# Patient Record
Sex: FEMALE | URBAN - METROPOLITAN AREA
[De-identification: names, ages, dates, MRNs, and addresses within clinical notes are randomized per-mention and may not be internally consistent; named-entity substitution may affect disease eponyms.]

---

## 2023-07-13 ENCOUNTER — INPATIENT (INPATIENT)
Facility: HOSPITAL | Age: 58
LOS: 4 days | Discharge: HOME | DRG: 286 | End: 2023-07-18
Attending: HOSPITALIST | Admitting: HOSPITALIST
Payer: COMMERCIAL

## 2023-07-13 VITALS
OXYGEN SATURATION: 97 % | DIASTOLIC BLOOD PRESSURE: 101 MMHG | HEART RATE: 93 BPM | TEMPERATURE: 98 F | HEIGHT: 67 IN | SYSTOLIC BLOOD PRESSURE: 163 MMHG | RESPIRATION RATE: 18 BRPM | WEIGHT: 184.97 LBS

## 2023-07-13 PROCEDURE — 99285 EMERGENCY DEPT VISIT HI MDM: CPT

## 2023-07-14 DIAGNOSIS — R60.0 LOCALIZED EDEMA: ICD-10-CM

## 2023-07-14 DIAGNOSIS — I10 ESSENTIAL (PRIMARY) HYPERTENSION: ICD-10-CM

## 2023-07-14 DIAGNOSIS — M79.89 OTHER SPECIFIED SOFT TISSUE DISORDERS: ICD-10-CM

## 2023-07-14 DIAGNOSIS — Z29.9 ENCOUNTER FOR PROPHYLACTIC MEASURES, UNSPECIFIED: ICD-10-CM

## 2023-07-14 DIAGNOSIS — E78.5 HYPERLIPIDEMIA, UNSPECIFIED: ICD-10-CM

## 2023-07-14 DIAGNOSIS — E11.9 TYPE 2 DIABETES MELLITUS WITHOUT COMPLICATIONS: ICD-10-CM

## 2023-07-14 DIAGNOSIS — I50.21 ACUTE SYSTOLIC (CONGESTIVE) HEART FAILURE: ICD-10-CM

## 2023-07-14 LAB
A1C WITH ESTIMATED AVERAGE GLUCOSE RESULT: 8.3 % — HIGH (ref 4–5.6)
ALBUMIN SERPL ELPH-MCNC: 4.1 G/DL — SIGNIFICANT CHANGE UP (ref 3.3–5)
ALP SERPL-CCNC: 61 U/L — SIGNIFICANT CHANGE UP (ref 40–120)
ALT FLD-CCNC: 17 U/L — SIGNIFICANT CHANGE UP (ref 10–45)
ANION GAP SERPL CALC-SCNC: 13 MMOL/L — SIGNIFICANT CHANGE UP (ref 5–17)
APPEARANCE UR: CLEAR — SIGNIFICANT CHANGE UP
AST SERPL-CCNC: 28 U/L — SIGNIFICANT CHANGE UP (ref 10–40)
BACTERIA # UR AUTO: NEGATIVE — SIGNIFICANT CHANGE UP
BASOPHILS # BLD AUTO: 0.02 K/UL — SIGNIFICANT CHANGE UP (ref 0–0.2)
BASOPHILS NFR BLD AUTO: 0.6 % — SIGNIFICANT CHANGE UP (ref 0–2)
BILIRUB SERPL-MCNC: 1.9 MG/DL — HIGH (ref 0.2–1.2)
BILIRUB UR-MCNC: ABNORMAL
BUN SERPL-MCNC: 11 MG/DL — SIGNIFICANT CHANGE UP (ref 7–23)
CALCIUM SERPL-MCNC: 9.2 MG/DL — SIGNIFICANT CHANGE UP (ref 8.4–10.5)
CHLORIDE SERPL-SCNC: 106 MMOL/L — SIGNIFICANT CHANGE UP (ref 96–108)
CO2 SERPL-SCNC: 23 MMOL/L — SIGNIFICANT CHANGE UP (ref 22–31)
COLOR SPEC: YELLOW — SIGNIFICANT CHANGE UP
CREAT SERPL-MCNC: 0.81 MG/DL — SIGNIFICANT CHANGE UP (ref 0.5–1.3)
DIFF PNL FLD: ABNORMAL
EGFR: 84 ML/MIN/1.73M2 — SIGNIFICANT CHANGE UP
EOSINOPHIL # BLD AUTO: 0.05 K/UL — SIGNIFICANT CHANGE UP (ref 0–0.5)
EOSINOPHIL NFR BLD AUTO: 1.4 % — SIGNIFICANT CHANGE UP (ref 0–6)
EPI CELLS # UR: 7 /HPF — HIGH
ESTIMATED AVERAGE GLUCOSE: 192 MG/DL — HIGH (ref 68–114)
GLUCOSE BLDC GLUCOMTR-MCNC: 139 MG/DL — HIGH (ref 70–99)
GLUCOSE BLDC GLUCOMTR-MCNC: 148 MG/DL — HIGH (ref 70–99)
GLUCOSE BLDC GLUCOMTR-MCNC: 170 MG/DL — HIGH (ref 70–99)
GLUCOSE BLDC GLUCOMTR-MCNC: 258 MG/DL — HIGH (ref 70–99)
GLUCOSE SERPL-MCNC: 204 MG/DL — HIGH (ref 70–99)
GLUCOSE UR QL: ABNORMAL
HCT VFR BLD CALC: 41.6 % — SIGNIFICANT CHANGE UP (ref 34.5–45)
HGB BLD-MCNC: 13.4 G/DL — SIGNIFICANT CHANGE UP (ref 11.5–15.5)
HYALINE CASTS # UR AUTO: 1 /LPF — SIGNIFICANT CHANGE UP (ref 0–7)
IMM GRANULOCYTES NFR BLD AUTO: 0.3 % — SIGNIFICANT CHANGE UP (ref 0–0.9)
KETONES UR-MCNC: NEGATIVE — SIGNIFICANT CHANGE UP
LEUKOCYTE ESTERASE UR-ACNC: ABNORMAL
LYMPHOCYTES # BLD AUTO: 1.39 K/UL — SIGNIFICANT CHANGE UP (ref 1–3.3)
LYMPHOCYTES # BLD AUTO: 39.4 % — SIGNIFICANT CHANGE UP (ref 13–44)
MAGNESIUM SERPL-MCNC: 1.6 MG/DL — SIGNIFICANT CHANGE UP (ref 1.6–2.6)
MCHC RBC-ENTMCNC: 32.2 GM/DL — SIGNIFICANT CHANGE UP (ref 32–36)
MCHC RBC-ENTMCNC: 32.4 PG — SIGNIFICANT CHANGE UP (ref 27–34)
MCV RBC AUTO: 100.7 FL — HIGH (ref 80–100)
MONOCYTES # BLD AUTO: 0.14 K/UL — SIGNIFICANT CHANGE UP (ref 0–0.9)
MONOCYTES NFR BLD AUTO: 4 % — SIGNIFICANT CHANGE UP (ref 2–14)
NEUTROPHILS # BLD AUTO: 1.92 K/UL — SIGNIFICANT CHANGE UP (ref 1.8–7.4)
NEUTROPHILS NFR BLD AUTO: 54.3 % — SIGNIFICANT CHANGE UP (ref 43–77)
NITRITE UR-MCNC: NEGATIVE — SIGNIFICANT CHANGE UP
NRBC # BLD: 0 /100 WBCS — SIGNIFICANT CHANGE UP (ref 0–0)
NT-PROBNP SERPL-SCNC: 3815 PG/ML — HIGH (ref 0–300)
PH UR: 5.5 — SIGNIFICANT CHANGE UP (ref 5–8)
PHOSPHATE SERPL-MCNC: 3.5 MG/DL — SIGNIFICANT CHANGE UP (ref 2.5–4.5)
PLATELET # BLD AUTO: 179 K/UL — SIGNIFICANT CHANGE UP (ref 150–400)
POTASSIUM SERPL-MCNC: 4.1 MMOL/L — SIGNIFICANT CHANGE UP (ref 3.5–5.3)
POTASSIUM SERPL-SCNC: 4.1 MMOL/L — SIGNIFICANT CHANGE UP (ref 3.5–5.3)
PROT SERPL-MCNC: 6.3 G/DL — SIGNIFICANT CHANGE UP (ref 6–8.3)
PROT UR-MCNC: ABNORMAL
RBC # BLD: 4.13 M/UL — SIGNIFICANT CHANGE UP (ref 3.8–5.2)
RBC # FLD: 14.6 % — HIGH (ref 10.3–14.5)
RBC CASTS # UR COMP ASSIST: 4 /HPF — SIGNIFICANT CHANGE UP (ref 0–4)
SODIUM SERPL-SCNC: 142 MMOL/L — SIGNIFICANT CHANGE UP (ref 135–145)
SP GR SPEC: 1.02 — SIGNIFICANT CHANGE UP (ref 1.01–1.02)
TROPONIN T, HIGH SENSITIVITY RESULT: 21 NG/L — SIGNIFICANT CHANGE UP (ref 0–51)
TROPONIN T, HIGH SENSITIVITY RESULT: 24 NG/L — SIGNIFICANT CHANGE UP (ref 0–51)
UROBILINOGEN FLD QL: ABNORMAL
WBC # BLD: 3.53 K/UL — LOW (ref 3.8–10.5)
WBC # FLD AUTO: 3.53 K/UL — LOW (ref 3.8–10.5)
WBC UR QL: 8 /HPF — HIGH (ref 0–5)

## 2023-07-14 PROCEDURE — 99223 1ST HOSP IP/OBS HIGH 75: CPT

## 2023-07-14 PROCEDURE — 71046 X-RAY EXAM CHEST 2 VIEWS: CPT | Mod: 26

## 2023-07-14 PROCEDURE — 93356 MYOCRD STRAIN IMG SPCKL TRCK: CPT

## 2023-07-14 PROCEDURE — 93970 EXTREMITY STUDY: CPT | Mod: 26

## 2023-07-14 PROCEDURE — 93306 TTE W/DOPPLER COMPLETE: CPT | Mod: 26

## 2023-07-14 RX ORDER — GLUCAGON INJECTION, SOLUTION 0.5 MG/.1ML
1 INJECTION, SOLUTION SUBCUTANEOUS ONCE
Refills: 0 | Status: DISCONTINUED | OUTPATIENT
Start: 2023-07-14 | End: 2023-07-18

## 2023-07-14 RX ORDER — SODIUM CHLORIDE 9 MG/ML
1000 INJECTION, SOLUTION INTRAVENOUS
Refills: 0 | Status: DISCONTINUED | OUTPATIENT
Start: 2023-07-14 | End: 2023-07-18

## 2023-07-14 RX ORDER — FUROSEMIDE 40 MG
20 TABLET ORAL ONCE
Refills: 0 | Status: COMPLETED | OUTPATIENT
Start: 2023-07-14 | End: 2023-07-14

## 2023-07-14 RX ORDER — ACETAMINOPHEN 500 MG
650 TABLET ORAL EVERY 6 HOURS
Refills: 0 | Status: DISCONTINUED | OUTPATIENT
Start: 2023-07-14 | End: 2023-07-18

## 2023-07-14 RX ORDER — DEXTROSE 50 % IN WATER 50 %
25 SYRINGE (ML) INTRAVENOUS ONCE
Refills: 0 | Status: DISCONTINUED | OUTPATIENT
Start: 2023-07-14 | End: 2023-07-18

## 2023-07-14 RX ORDER — INSULIN LISPRO 100/ML
VIAL (ML) SUBCUTANEOUS
Refills: 0 | Status: DISCONTINUED | OUTPATIENT
Start: 2023-07-14 | End: 2023-07-18

## 2023-07-14 RX ORDER — DEXTROSE 50 % IN WATER 50 %
12.5 SYRINGE (ML) INTRAVENOUS ONCE
Refills: 0 | Status: DISCONTINUED | OUTPATIENT
Start: 2023-07-14 | End: 2023-07-18

## 2023-07-14 RX ORDER — LANOLIN ALCOHOL/MO/W.PET/CERES
3 CREAM (GRAM) TOPICAL AT BEDTIME
Refills: 0 | Status: DISCONTINUED | OUTPATIENT
Start: 2023-07-14 | End: 2023-07-18

## 2023-07-14 RX ORDER — INSULIN LISPRO 100/ML
VIAL (ML) SUBCUTANEOUS AT BEDTIME
Refills: 0 | Status: DISCONTINUED | OUTPATIENT
Start: 2023-07-14 | End: 2023-07-18

## 2023-07-14 RX ORDER — ATORVASTATIN CALCIUM 80 MG/1
80 TABLET, FILM COATED ORAL AT BEDTIME
Refills: 0 | Status: DISCONTINUED | OUTPATIENT
Start: 2023-07-14 | End: 2023-07-18

## 2023-07-14 RX ORDER — DEXTROSE 50 % IN WATER 50 %
15 SYRINGE (ML) INTRAVENOUS ONCE
Refills: 0 | Status: DISCONTINUED | OUTPATIENT
Start: 2023-07-14 | End: 2023-07-18

## 2023-07-14 RX ORDER — AMLODIPINE BESYLATE 2.5 MG/1
10 TABLET ORAL DAILY
Refills: 0 | Status: DISCONTINUED | OUTPATIENT
Start: 2023-07-14 | End: 2023-07-15

## 2023-07-14 RX ORDER — FUROSEMIDE 40 MG
20 TABLET ORAL DAILY
Refills: 0 | Status: DISCONTINUED | OUTPATIENT
Start: 2023-07-15 | End: 2023-07-16

## 2023-07-14 RX ADMIN — AMLODIPINE BESYLATE 10 MILLIGRAM(S): 2.5 TABLET ORAL at 12:18

## 2023-07-14 RX ADMIN — ATORVASTATIN CALCIUM 80 MILLIGRAM(S): 80 TABLET, FILM COATED ORAL at 21:58

## 2023-07-14 RX ADMIN — Medication 1: at 21:59

## 2023-07-14 RX ADMIN — Medication 20 MILLIGRAM(S): at 21:59

## 2023-07-14 RX ADMIN — Medication 20 MILLIGRAM(S): at 03:12

## 2023-07-14 NOTE — H&P ADULT - HISTORY OF PRESENT ILLNESS
58 y/o F w PMHx of HTN, T2DM, HLD presenting with 3 weeks of worsening LE edema. Has been having LE edema for the past couple of weeks; LLE swelling worse than RLE. Also admitted to areas of "weeping" fluid 2 weeks ago that has now resolved. She had recent travel to [place] but said swelling had started prior to her trip. She also endorses decreased UOP. Denies any CP, SOB, n/v, abd pain, diarrhea, dysuria, fevers, chills. Presented to the ED for further evaluation of LE edema.   Of note, patient has been noncompliant with her home meds.   In ED, afebrile, HR 85, 176/118, 96% on RA. pBNP 3815, CXR w/ cardiomegaly with mild pulmonary edema. Given lasix 20 IV x1 in ED and admitted to medicine for further management.

## 2023-07-14 NOTE — H&P ADULT - TIME BILLING
reviewing documentation/labs/imaging, interviewing and examining patient, documentation, coordinating care with ACP/CM/Specialists

## 2023-07-14 NOTE — ED PROVIDER NOTE - OBJECTIVE STATEMENT
This is a 57-year-old female with past medical history of diabetes, hypertension, hyperlipidemia presenting with swelling of the lower extremities.  For past several weeks patient has had on and off swelling of the bilateral lower extremities.  During this time patient is also been in [place], eating a lot of salty foods, reduced compliance of her medications.  Patient also endorses reduced urine output but denies CP, SOB, orthopnea, fever, sore throat, abdominal, nausea, vomiting, diarrhea, LE pain.

## 2023-07-14 NOTE — H&P ADULT - NSHPLABSRESULTS_GEN_ALL_CORE
LABS:                         13.4   3.53  )-----------( 179      ( 2023 01:27 )             41.6     07-14    142  |  106  |  11  ----------------------------<  204<H>  4.1   |  23  |  0.81    Ca    9.2      2023 01:28  Phos  3.5     07-14  Mg     1.6     -14    TPro  6.3  /  Alb  4.1  /  TBili  1.9<H>  /  DBili  x   /  AST  28  /  ALT  17  /  AlkPhos  61  07-14      Urinalysis Basic - ( 2023 01:55 )    Color: Yellow / Appearance: Clear / S.024 / pH: x  Gluc: x / Ketone: Negative  / Bili: Small / Urobili: 8 mg/dL   Blood: x / Protein: 100 mg/dl / Nitrite: Negative   Leuk Esterase: Small / RBC: 4 /hpf / WBC 8 /HPF   Sq Epi: x / Non Sq Epi: x / Bacteria: Negative      EKG: NSR  CXR: cardiomegaly with mild pulmonary edema.  LE Duplex  IMPRESSION:    No evidence of bilateral lower extremity deep venous thrombosis.    Bilateral lower extremity superficial soft tissue edema. Correlate   clinically.    Slightly increased pulsatility of common femoral vein waveforms, which   may be due to underlying cardiac dysfunction. Correlate with   echocardiogram.

## 2023-07-14 NOTE — H&P ADULT - PROBLEM SELECTOR PLAN 1
3 weeks of worsening B/L LE edema. No CP, SOB. CXR with mild pulm edema. Possible new HF. pBNP 3000s. Trop neg.   -LE duplex neg for DVT   -S/p Lasix 20 IV x1 in ED. Will continue lasix 20 IV daily for now.   -Monitor I/O; daily weight   -TTE

## 2023-07-14 NOTE — ED PROVIDER NOTE - NS ED ROS FT
General: denies fever, chills  Eyes: denies visual changes, blurred vision  CV: denies chest pain, palpitations  Resp: denies difficulty breathing, cough, orthopnea  Abdominal: denies nausea, vomiting, diarrhea, abdominal pain  : +reduced urinary output, denies urinary pain or discharge  MSK: +BL LE swelling without LE pain, denies muscle aches  Neuro: denies headaches, numbness, tingling  Skin: denies rashes, bruises

## 2023-07-14 NOTE — H&P ADULT - NSHPPHYSICALEXAM_GEN_ALL_CORE
Vital Signs Last 24 Hrs  T(C): 36.6 (14 Jul 2023 08:28), Max: 36.9 (14 Jul 2023 01:45)  T(F): 97.9 (14 Jul 2023 08:28), Max: 98.5 (14 Jul 2023 01:45)  HR: 78 (14 Jul 2023 08:28) (78 - 93)  BP: 158/96 (14 Jul 2023 08:28) (158/96 - 176/118)  BP(mean): 117 (14 Jul 2023 08:28) (117 - 117)  RR: 18 (14 Jul 2023 08:28) (18 - 18)  SpO2: 96% (14 Jul 2023 08:28) (94% - 97%)    Parameters below as of 14 Jul 2023 08:28  Patient On (Oxygen Delivery Method): room air        CONSTITUTIONAL: Well-groomed, in no apparent distress  EYES: No conjunctival or scleral injection, non-icteric; EOMI  ENMT: No external nasal lesions; no pharyngeal injection or exudates, oral mucosa with moist membranes  NECK: Supple; Trachea midline  RESPIRATORY: Breathing comfortably; lungs CTA without wheeze  CARDIOVASCULAR: +S1S2, RRR; pedal pulses full and symmetric; + b/l LE (L >R); edema up to thighs   GASTROINTESTINAL: No tenderness, +BS throughout, no rebound/guarding  MUSCULOSKELETAL: No digital clubbing or cyanosis; no paraspinal tenderness; examination of the  (head/neck, spine/ribs/pelvis, RUE, LUE, RLE, LLE) without misalignment, normal strength and tone of extremities  NEUROLOGIC: CN II-XII grossly intact; sensation intact in LEs b/l to light touch  PSYCHIATRIC: A+O x 3; mood and affect appropriate; appropriate insight and judgment

## 2023-07-14 NOTE — PROGRESS NOTE ADULT - SUBJECTIVE AND OBJECTIVE BOX
PATIENT: BHUPENDRA ROE, MRN: [id number]    CHIEF COMPLAINT: Patient is a 57y old  Female who presents with a chief complaint of CC: LE swelling (2023 10:16)      INTERVAL HISTORY/OVERNIGHT EVENTS: Pt received lasix in ED tolerated, feels like her legs are less swollen today.       MEDICATIONS:  MEDICATIONS  (STANDING):  amLODIPine   Tablet 10 milliGRAM(s) Oral daily  atorvastatin 80 milliGRAM(s) Oral at bedtime  dextrose 5%. 1000 milliLiter(s) (100 mL/Hr) IV Continuous <Continuous>  dextrose 5%. 1000 milliLiter(s) (50 mL/Hr) IV Continuous <Continuous>  dextrose 50% Injectable 25 Gram(s) IV Push once  dextrose 50% Injectable 12.5 Gram(s) IV Push once  dextrose 50% Injectable 25 Gram(s) IV Push once  glucagon  Injectable 1 milliGRAM(s) IntraMuscular once  insulin lispro (ADMELOG) corrective regimen sliding scale   SubCutaneous three times a day before meals  insulin lispro (ADMELOG) corrective regimen sliding scale   SubCutaneous at bedtime    MEDICATIONS  (PRN):  acetaminophen     Tablet .. 650 milliGRAM(s) Oral every 6 hours PRN Temp greater or equal to 38C (100.4F), Mild Pain (1 - 3)  dextrose Oral Gel 15 Gram(s) Oral once PRN Blood Glucose LESS THAN 70 milliGRAM(s)/deciliter  melatonin 3 milliGRAM(s) Oral at bedtime PRN Insomnia      ALLERGIES: Allergies    No Known Allergies    Intolerances        OBJECTIVE:  ICU Vital Signs Last 24 Hrs  T(C): 37 (2023 14:47), Max: 37 (2023 14:47)  T(F): 98.6 (2023 14:47), Max: 98.6 (2023 14:47)  HR: 81 (2023 17:14) (78 - 93)  BP: 133/83 (2023 17:14) (133/83 - 176/118)  BP(mean): 117 (2023 08:28) (117 - 117)  ABP: --  ABP(mean): --  RR: 16 (2023 17:14) (16 - 18)  SpO2: 99% (2023 14:47) (94% - 100%)    O2 Parameters below as of 2023 14:47  Patient On (Oxygen Delivery Method): room air            CAPILLARY BLOOD GLUCOSE      POCT Blood Glucose.: 148 mg/dL (2023 17:50)  POCT Blood Glucose.: 139 mg/dL (2023 14:45)  POCT Blood Glucose.: 170 mg/dL (2023 12:01)    CAPILLARY BLOOD GLUCOSE      POCT Blood Glucose.: 148 mg/dL (2023 17:50)    I&O's Summary    Daily Height in cm: 170.18 (2023 21:51)    Daily     PHYSICAL EXAMINATION:  General: Comfortable, no acute distress, cooperative with exam.  HEENT: PERRLA  Respiratory: CTAB, normal respiratory effort, no coughing, wheezes, crackles, or rales.  CV: RRR, S1S2, no murmurs, rubs or gallops. No JVD. Distal pulses intact.  Abdominal: Soft, nontender, nondistended, no rebound or guarding, normal bowel sounds.  Neurology: AOx3, no focal neuro defects, WATTS x 4.  Extremities: + pitting edema to level of shins b/l      LABS:                          13.4   3.53  )-----------( 179      ( 2023 01:27 )             41.6     07-14    142  |  106  |  11  ----------------------------<  204<H>  4.1   |  23  |  0.81    Ca    9.2      2023 01:28  Phos  3.5     07-14  Mg     1.6     07-14    TPro  6.3  /  Alb  4.1  /  TBili  1.9<H>  /  DBili  x   /  AST  28  /  ALT  17  /  AlkPhos  61  07-14    LIVER FUNCTIONS - ( 2023 01:28 )  Alb: 4.1 g/dL / Pro: 6.3 g/dL / ALK PHOS: 61 U/L / ALT: 17 U/L / AST: 28 U/L / GGT: x                   Urinalysis Basic - ( 2023 01:55 )    Color: Yellow / Appearance: Clear / S.024 / pH: x  Gluc: x / Ketone: Negative  / Bili: Small / Urobili: 8 mg/dL   Blood: x / Protein: 100 mg/dl / Nitrite: Negative   Leuk Esterase: Small / RBC: 4 /hpf / WBC 8 /HPF   Sq Epi: x / Non Sq Epi: x / Bacteria: Negative

## 2023-07-14 NOTE — H&P ADULT - NSHPSOCIALHISTORY_GEN_ALL_CORE
denies tobacco use  social etoh use  denies drug use  lives alone in apt  no difficulties with ambulation PTA

## 2023-07-14 NOTE — ED ADULT NURSE NOTE - NSFALLUNIVINTERV_ED_ALL_ED
Bed/Stretcher in lowest position, wheels locked, appropriate side rails in place/Call bell, personal items and telephone in reach/Instruct patient to call for assistance before getting out of bed/chair/stretcher/Non-slip footwear applied when patient is off stretcher/[place] to call system/Physically safe environment - no spills, clutter or unnecessary equipment/Purposeful proactive rounding/Room/bathroom lighting operational, light cord in reach

## 2023-07-14 NOTE — ED PROVIDER NOTE - ATTENDING CONTRIBUTION TO CARE
------------ATTENDING NOTE------------  pt w/ daughter c/o 2 wks of gradually increasing BLE edema, worsening dyspnea on exertion and mild orthopnea, no chest pain/discomfort, no sob/dyspnea at rest, no fevers, pt has DM and HTN and HLD and poorly compliant w/ medications / diet, concerned she has decreased urine outpt, awaiting labs/iamging and close reassessments -->  - Chevy Turcios MD   ----------------------------------------------

## 2023-07-14 NOTE — ED ADULT NURSE NOTE - OBJECTIVE STATEMENT
56 y/o female presents to the ED endorsing BL LE edema x3 weeks. A/Ox4. Ambulatory without assistive devices at baseline. PMH: HTN, HLD and DM. Patient endorses on 7/13/23 a small "spot" on the LLE that was "leaking fluid". Patient denies chest pain, dyspnea, fever, cough, chills, nausea and vomiting. Upon assessment, sensation, motor and pulses intact in the BL LE. BL pitting edema of the LE noted. Patient on CM, NSR. Safety and comfort provided.

## 2023-07-14 NOTE — ED PROVIDER NOTE - PHYSICAL EXAMINATION
GENERAL: well appearing in no acute distress, non-toxic appearing  HEAD: normocephalic, atraumatic  HEENT: normal conjunctiva, oral mucosa moist, neck supple, no JVD  CARDIAC: regular rate and rhythm, normal S1S2, no appreciable murmurs, 2+ pulses in UE/LE b/l  PULM: normal breath sounds, clear to ascultation bilaterally, no rales, rhonchi, wheezing  GI: abdomen nondistended, soft, nontender, no guarding, rebound tenderness  NEURO: no focal motor or sensory deficits, normal speech, normal gait, AAOx3  MSK: +BL LE pitting edema to hips, no calf tenderness b/l  SKIN: well-perfused, extremities warm, no visible rashes  PSYCH: appropriate mood and affect

## 2023-07-14 NOTE — H&P ADULT - ASSESSMENT
58 y/o F w PMHx of HTN, T2DM, HLD, noncompliant with home medications presenting with 3 weeks of worsening LE edema.

## 2023-07-14 NOTE — PROGRESS NOTE ADULT - PROBLEM SELECTOR PLAN 1
3 weeks of worsening B/L LE edema. No CP, SOB. CXR with mild pulm edema.  pBNP 3000s. Trop neg.   -LE duplex neg for DVT   -S/p Lasix 20 IV x1 in ED. Will continue lasix 20 IV daily for now next dose 7/15 AM   -Monitor I/O; daily weight   -TTE: EF 30-35%

## 2023-07-14 NOTE — H&P ADULT - NSHPREVIEWOFSYSTEMS_GEN_ALL_CORE
Review of Systems:   CONSTITUTIONAL: No fever  EYES: No eye pain or discharge  ENMT:  No tinnitus, vertigo; No sinus or throat pain  RESPIRATORY: No SOB. No cough, wheezing, chills or hemoptysis  CARDIOVASCULAR: No chest pain, palpitations, dizziness. +leg swelling  GASTROINTESTINAL: No abdominal or epigastric pain. No nausea, vomiting, or hematemesis; No diarrhea or constipation. No melena or hematochezia.  GENITOURINARY: No dysuria, frequency, hematuria, or incontinence  NEUROLOGICAL: No headaches, loss of strength, numbness, or tremors  SKIN: No itching, burning  MUSCULOSKELETAL: No muscle, back pain  PSYCHIATRIC: No depression, +anxiety, no mood swings  HEME/LYMPH: No easy bruising, or bleeding gums

## 2023-07-14 NOTE — H&P ADULT - PROBLEM SELECTOR PLAN 2
Noncompliant with home medications. Hypertensive on arrival to ED.   -C/w norvasc with holding parameters   -Monitor BP

## 2023-07-15 LAB
A1C WITH ESTIMATED AVERAGE GLUCOSE RESULT: 8.3 % — HIGH (ref 4–5.6)
ALBUMIN SERPL ELPH-MCNC: 3.9 G/DL — SIGNIFICANT CHANGE UP (ref 3.3–5)
ALP SERPL-CCNC: 60 U/L — SIGNIFICANT CHANGE UP (ref 40–120)
ALT FLD-CCNC: 19 U/L — SIGNIFICANT CHANGE UP (ref 10–45)
ANION GAP SERPL CALC-SCNC: 13 MMOL/L — SIGNIFICANT CHANGE UP (ref 5–17)
AST SERPL-CCNC: 32 U/L — SIGNIFICANT CHANGE UP (ref 10–40)
BILIRUB DIRECT SERPL-MCNC: 0.6 MG/DL — HIGH (ref 0–0.3)
BILIRUB INDIRECT FLD-MCNC: 2.1 MG/DL — HIGH (ref 0.2–1)
BILIRUB SERPL-MCNC: 2.7 MG/DL — HIGH (ref 0.2–1.2)
BUN SERPL-MCNC: 8 MG/DL — SIGNIFICANT CHANGE UP (ref 7–23)
CALCIUM SERPL-MCNC: 9.4 MG/DL — SIGNIFICANT CHANGE UP (ref 8.4–10.5)
CHLORIDE SERPL-SCNC: 102 MMOL/L — SIGNIFICANT CHANGE UP (ref 96–108)
CHOLEST SERPL-MCNC: 132 MG/DL — SIGNIFICANT CHANGE UP
CO2 SERPL-SCNC: 28 MMOL/L — SIGNIFICANT CHANGE UP (ref 22–31)
CREAT SERPL-MCNC: 0.76 MG/DL — SIGNIFICANT CHANGE UP (ref 0.5–1.3)
EGFR: 91 ML/MIN/1.73M2 — SIGNIFICANT CHANGE UP
ESTIMATED AVERAGE GLUCOSE: 192 MG/DL — HIGH (ref 68–114)
GLUCOSE BLDC GLUCOMTR-MCNC: 147 MG/DL — HIGH (ref 70–99)
GLUCOSE BLDC GLUCOMTR-MCNC: 148 MG/DL — HIGH (ref 70–99)
GLUCOSE BLDC GLUCOMTR-MCNC: 153 MG/DL — HIGH (ref 70–99)
GLUCOSE BLDC GLUCOMTR-MCNC: 193 MG/DL — HIGH (ref 70–99)
GLUCOSE SERPL-MCNC: 174 MG/DL — HIGH (ref 70–99)
HCT VFR BLD CALC: 42.4 % — SIGNIFICANT CHANGE UP (ref 34.5–45)
HCV AB S/CO SERPL IA: 0.17 S/CO — SIGNIFICANT CHANGE UP (ref 0–0.99)
HCV AB SERPL-IMP: SIGNIFICANT CHANGE UP
HDLC SERPL-MCNC: 41 MG/DL — LOW
HGB BLD-MCNC: 13.7 G/DL — SIGNIFICANT CHANGE UP (ref 11.5–15.5)
LIPID PNL WITH DIRECT LDL SERPL: 78 MG/DL — SIGNIFICANT CHANGE UP
MAGNESIUM SERPL-MCNC: 1.6 MG/DL — SIGNIFICANT CHANGE UP (ref 1.6–2.6)
MCHC RBC-ENTMCNC: 32.2 PG — SIGNIFICANT CHANGE UP (ref 27–34)
MCHC RBC-ENTMCNC: 32.3 GM/DL — SIGNIFICANT CHANGE UP (ref 32–36)
MCV RBC AUTO: 99.8 FL — SIGNIFICANT CHANGE UP (ref 80–100)
MRSA PCR RESULT.: SIGNIFICANT CHANGE UP
NON HDL CHOLESTEROL: 92 MG/DL — SIGNIFICANT CHANGE UP
NRBC # BLD: 0 /100 WBCS — SIGNIFICANT CHANGE UP (ref 0–0)
PLATELET # BLD AUTO: 166 K/UL — SIGNIFICANT CHANGE UP (ref 150–400)
POTASSIUM SERPL-MCNC: 3.9 MMOL/L — SIGNIFICANT CHANGE UP (ref 3.5–5.3)
POTASSIUM SERPL-SCNC: 3.9 MMOL/L — SIGNIFICANT CHANGE UP (ref 3.5–5.3)
PROT SERPL-MCNC: 6.9 G/DL — SIGNIFICANT CHANGE UP (ref 6–8.3)
RBC # BLD: 4.25 M/UL — SIGNIFICANT CHANGE UP (ref 3.8–5.2)
RBC # FLD: 14.2 % — SIGNIFICANT CHANGE UP (ref 10.3–14.5)
S AUREUS DNA NOSE QL NAA+PROBE: SIGNIFICANT CHANGE UP
SODIUM SERPL-SCNC: 143 MMOL/L — SIGNIFICANT CHANGE UP (ref 135–145)
TRIGL SERPL-MCNC: 65 MG/DL — SIGNIFICANT CHANGE UP
WBC # BLD: 2.65 K/UL — LOW (ref 3.8–10.5)
WBC # FLD AUTO: 2.65 K/UL — LOW (ref 3.8–10.5)

## 2023-07-15 PROCEDURE — 99233 SBSQ HOSP IP/OBS HIGH 50: CPT

## 2023-07-15 PROCEDURE — 93010 ELECTROCARDIOGRAM REPORT: CPT

## 2023-07-15 PROCEDURE — 99222 1ST HOSP IP/OBS MODERATE 55: CPT

## 2023-07-15 RX ORDER — LOSARTAN POTASSIUM 100 MG/1
25 TABLET, FILM COATED ORAL DAILY
Refills: 0 | Status: DISCONTINUED | OUTPATIENT
Start: 2023-07-15 | End: 2023-07-16

## 2023-07-15 RX ORDER — CHLORHEXIDINE GLUCONATE 213 G/1000ML
1 SOLUTION TOPICAL DAILY
Refills: 0 | Status: DISCONTINUED | OUTPATIENT
Start: 2023-07-15 | End: 2023-07-18

## 2023-07-15 RX ADMIN — Medication 1: at 17:52

## 2023-07-15 RX ADMIN — Medication 20 MILLIGRAM(S): at 05:38

## 2023-07-15 RX ADMIN — Medication 650 MILLIGRAM(S): at 22:07

## 2023-07-15 RX ADMIN — AMLODIPINE BESYLATE 10 MILLIGRAM(S): 2.5 TABLET ORAL at 05:40

## 2023-07-15 RX ADMIN — Medication 650 MILLIGRAM(S): at 22:35

## 2023-07-15 RX ADMIN — CHLORHEXIDINE GLUCONATE 1 APPLICATION(S): 213 SOLUTION TOPICAL at 11:22

## 2023-07-15 RX ADMIN — LOSARTAN POTASSIUM 25 MILLIGRAM(S): 100 TABLET, FILM COATED ORAL at 09:29

## 2023-07-15 RX ADMIN — ATORVASTATIN CALCIUM 80 MILLIGRAM(S): 80 TABLET, FILM COATED ORAL at 21:15

## 2023-07-15 NOTE — PROGRESS NOTE ADULT - ASSESSMENT
58 y/o F w PMHx of HTN, T2DM, HLD, noncompliant with home medications presenting with 3 weeks of worsening LE edema.  58 y/o F w PMHx of HTN, T2DM, HLD, noncompliant with home medications presenting with 3 weeks of worsening LE edema with TTE showing EF of 30-35%, admitted for new heart failure.

## 2023-07-15 NOTE — DISCHARGE NOTE PROVIDER - HOSPITAL COURSE
56 y/o F w PMHx of HTN, T2DM, HLD presenting with 3 weeks of worsening LE edema. She also admits to areas of "weeping" fluid 2 weeks ago that has now resolved. She recently travelled to [place] but said swelling had started prior to her trip. She also endorses decreased UOP. Of note, patient has been noncompliant with her home meds. She also has an extensive drinking history- she drinks 4 shots and 5 beers 5/7 days of the week for the past 30-40 years.     In ED, afebrile, HR 85, 176/118, 96% on RA. pBNP 3815, CXR w/ cardiomegaly with mild pulmonary edema.     In this hospital she had a TTE which revealed an EF of 30-35% with a dilated LV. She was started on IV lasix and has been diuresing appropriately with improved symptoms. The cardiology team saw her ..    58 y/o F w PMHx of HTN, T2DM, HLD presenting with 3 weeks of worsening LE edema. She also admits to areas of "weeping" fluid 2 weeks ago that has now resolved. She recently travelled to [place] but said swelling had started prior to her trip. She also endorses decreased UOP. Of note, patient has been noncompliant with her home meds. She also has an extensive drinking history- she drinks 4 shots and 5 beers 5/7 days of the week for the past 30-40 years.     In ED, afebrile, HR 85, 176/118, 96% on RA. pBNP 3815, CXR w/ cardiomegaly with mild pulmonary edema.     In this hospital she had a TTE which revealed an EF of 30-35% with a dilated LV. She was started on IV lasix and has been diuresing appropriately with improved symptoms. The cardiology team saw her and recommended a left heart catheterization to rule out ischemic disease. The cath procedure revealed no coronary artery disease. Pt was started on Entresto, Toprol, and Aldactone for GDMT and continued on Lasix.     Overall patient is stable and ready for discharge with appropriate cardiac followup.    58 y/o F w PMHx of HTN, T2DM, HLD presenting with 3 weeks of worsening LE edema admitted for new HFrEF. She also admits to areas of "weeping" fluid 2 weeks ago that has now resolved. She recently travelled to [place] but said swelling had started prior to her trip. She also endorses decreased UOP. Of note, patient has been noncompliant with her home meds. She also has an extensive drinking history- she drinks 4 shots and 5 beers 5/7 days of the week for the past 30-40 years.     In ED, afebrile, HR 85, 176/118, 96% on RA. pBNP 3815, CXR w/ cardiomegaly with mild pulmonary edema.     In this hospital she had a TTE which revealed an EF of 30-35% with a dilated LV. She was started on IV lasix and has been diuresing appropriately with improved symptoms. The cardiology team saw her and recommended a left heart catheterization to rule out ischemic disease. The cath procedure revealed no coronary artery disease. Pt was started on Entresto, Toprol, and Aldactone for GDMT and continued on Lasix.     Overall patient is stable and ready for discharge with appropriate cardiac followup.    58 y/o F w PMHx of HTN, T2DM, HLD presenting with 3 weeks of worsening LE edema. Reportedly noncompliant with her home meds. She also has an extensive drinking history- she drinks 4 shots and 5 beers 5/7 days of the week for the past 30-40 years.     In ED, afebrile, HR 85, 176/118, 96% on RA. pBNP 3815, CXR w/ cardiomegaly with mild pulmonary edema.     In this hospital she had a TTE which revealed an EF of 30-35% with a dilated LV. She was started on IV lasix and diuresed appropriately with improved symptoms. The cardiology team saw her and recommended a left heart catheterization to rule out ischemic disease. The cath procedure revealed no coronary artery disease. Pt was started on Entresto, Toprol, and Aldactone for GDMT and continued on Lasix.     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     Discharged with follow up.

## 2023-07-15 NOTE — PROGRESS NOTE ADULT - PROBLEM SELECTOR PLAN 1
3 weeks of worsening B/L LE edema. No CP, SOB. CXR with mild pulm edema.  pBNP 3000s. Trop neg.   -LE duplex neg for DVT   -S/p Lasix 20 IV x1 in ED. Will continue lasix 20 IV daily for now next dose 7/15 AM   -Monitor I/O; daily weight   -TTE: EF 30-35% 3 weeks of worsening B/L LE edema. No CP, SOB. CXR with mild pulm edema.  pBNP 3000s. Trop neg.   -LE duplex neg for DVT   -S/p Lasix 20 IV x1 in ED. Will continue lasix 20 IV daily for now next dose 7/15 AM   -Monitor I/O; daily weight   -TTE: EF 30-35%    -f/u heart failure team recs 3 weeks of worsening B/L LE edema. No CP, SOB. CXR with mild pulm edema.  pBNP 3000s. Trop neg.   -LE duplex neg for DVT   -S/p Lasix 20 IV x1 in ED. Will continue lasix 20 IV daily for now next dose 7/15 AM   -Monitor I/O; daily weight   -TTE: EF 30-35%  -f/u EKG    -f/u heart failure team recs 3 weeks of worsening B/L LE edema. No CP, SOB. CXR with mild pulm edema.  pBNP 3000s. Trop neg.   -LE duplex neg for DVT   -S/p Lasix 20 IV x1 in ED  -Monitor I/O; daily weight - net neg 1.6L   -TTE: EF 30-35%, global akinesis   -continue lasix 20 IV daily  -f/u EKG  -f/u heart failure team recs

## 2023-07-15 NOTE — SBIRT NOTE ADULT - NSSBIRTDRGACTION/INTER_GEN_A_CORE
Transitional Care Management Telephone Call    Today's Date: 8/11/2022      Discharge Date: 8/8/2022    Dis[facility] From: [facility]    Items for attention:     Hospital Course:      60-year-old female with a past medical history of coronary artery disease status post bypass 5 yrs back on 06/25/2022, chronic kidney disease stage 3, essential hypertension, hyperlipidemia, hepatitis-C , alcohol abuse in the past, remote history of nicotine abuse was brought to the ER with altered mental status and seizure-like episode.  As per the brother at the bedside patient has not been feeling well since her surgery 5 weeks back but has progressively declined over the past 3 weeks with multiple episodes of confusion and seizure-like activity.  Patient is also been unsteady with ambulation.  Patient already on aspirin and Plavix at home  On arrival in ER vital signs indicated blood pressure of 168/98 heart rate of 69 oxygen saturations of 100%.  Basic labs indicate creatinine of 1.7, BNP 3000, negative troponin.  Ammonia, lactic acid negative.  Hemoglobin 9.6.  COVID negative.  Head CT level 1, suggestive of possible left cerebellar stroke acute versus subacute.  Patient  not a candidate for tPA secondary to recent bypass surgery.  Tele neurology recommended stroke workup.  Patient is already on aspirin and Plavix.  Stroke protocol initiated.  MRI of the brain showed left superior cerebellar stroke possibly remote.  Carotid Dopplers did not reveal any evidence of hemodynamically significant carotid stenosis.  Echocardiogram unremarkable.  No arrhythmia on tele monitor.    Cardiology and Neurology consulted.  Dose of aspirin increased to 325 mg daily along with Plavix.  Continued on statins.  EEG did not reveal any evidence of seizure..  Neurology recommended avoiding Wellbutrin secondary to high risk of precipitating seizures.   cardiology plans to follow-up as outpatient for loop recorder and LARISA.  Patient was also started on  amlodipine following which her blood pressure is significantly improved.  Patient is discharged home in stable clinical condition with home care palliative orders with skilled nursing, PT, OT, speech therapy.       Care Transitions Assessment    Utilization:  Visit Hospital Last 30 Days: Unplanned inpatient admission   Perception of Change in Health Status D/C: Improving  Discharged To: Home with home health  Discharge Instructions: Received discharge instructions; Understands d/c instructions  Transition of Care Information Provided:    Phone Call to Facility to Check Status:      Medications:  Prescriptions:    IP/Amb Med List Reviewed with Patient: Yes  Med Prescriptions Filled After D/C: Confirms taking as prescribed  Questions About Meds Prescribed at D/C: Denies questions    Follow-up Care:  Appointments: TCM  Provider Appt Scheduled Prior to D/C: Yes  Provider Appt Date: 08/16/22  Provider Appt to be Scheduled by:    Type of Provider: PCP     Appt Scheduled Prior to D/C: Yes  Specialist Appt Date: 09/09/22  Type of Specialist: Cardiology    Follow-up Appt Status:      Skilled Services: Yes  Skilled Services Options: Nursing; Occupational therapy; Physical therapy; Speech lang    Equipment/DME:   Does Patient Currently Have DME: Yes  DME Type: Walker  Equipment/DME Intervention Needed:    Oxygen In Use:    Oxygen Comments:      Review of Systems:   Care Transition System Evaluation:    General Symptoms: Weakness; Fatigue   Fever: is not present   Temperature:     Pain:  0  Pain Location:    Respiratory Symptoms: None  Shortness of Breath:    Cardiovascular Symptoms: Fatigue  Hours of Uninterrupted Sleep:    Sleeping Behaviors:    GI Symptoms: None  Abdominal Tenderness:    Bowel Ostomy Type:     Symptoms: None  Urinary Elimination: Voiding, no difficulities  Feeding Tube Type: None  Skin Symptoms: None  Wound Type: None    Activities of Daily Living (global): Partial assistance    Patient  states that she does have sufficient family support. She feels that she is able to ask for assistance when needed.     Called Usha and spoke with her sister Sameera, which is okay with patient. Overall she feels like Usha is doing okay. Denies CP, SOB or fevers. We discussed AVS and medications, currently no questions/concerns. If Usha goes to [facility] in [place] her son will take her and if in [place], Kady take her. She does use a walker and feels safe. She will have HH services and let Kady know that if they do not call her by this afternoon, to please call me and I will look into it further. She states understanding.        None

## 2023-07-15 NOTE — DISCHARGE NOTE PROVIDER - NSDCMRMEDTOKEN_GEN_ALL_CORE_FT
amLODIPine 10 mg oral tablet: 1 tab(s) orally once a day NOTE: As per Patient, she is not compliant with taking her medication daily, only takes it whenever she feels it so she still have some medication left from 10/22 last time when her med was filled by the Pharmacy.  metFORMIN 500 mg oral tablet: 1 tab(s) orally once a day NOTE: As per Patient, she is not compliant with taking her medication daily, only takes it whenever she feels it so she still have some medication left from 10/22 last time when her med was filled by the Pharmacy.  rosuvastatin 40 mg oral tablet: 1 tab(s) orally once a day NOTE: As per Patient, she is not compliant with taking her medication daily, only takes it whenever she feels it so she still have some medication left from 10/22 last time when her med was filled by the Pharmacy.   amLODIPine 10 mg oral tablet: 1 tab(s) orally once a day NOTE: As per Patient, she is not compliant with taking her medication daily, only takes it whenever she feels it so she still have some medication left from 10/22 last time when her med was filled by the Pharmacy.  metFORMIN 500 mg oral tablet: 1 tab(s) orally once a day NOTE: As per Patient, she is not compliant with taking her medication daily, only takes it whenever she feels it so she still have some medication left from 10/22 last time when her med was filled by the Pharmacy.  rosuvastatin 40 mg oral tablet: 1 tab(s) orally once a day NOTE: As per Patient, she is not compliant with taking her medication daily, only takes it whenever she feels it so she still have some medication left from 10/22 last time when her med was filled by the Pharmacy.  sacubitril-valsartan 24 mg-26 mg oral tablet: 1 tab(s) orally 2 times a day   furosemide 20 mg oral tablet: 1 tab(s) orally once a day  metFORMIN 500 mg oral tablet: 1 tab(s) orally 2 times a day  metoprolol succinate 25 mg oral tablet, extended release: 1 tab(s) orally once a day  rosuvastatin 40 mg oral tablet: 1 tab(s) orally once a day NOTE: As per Patient, she is not compliant with taking her medication daily, only takes it whenever she feels it so she still have some medication left from 10/22 last time when her med was filled by the Pharmacy.  sacubitril-valsartan 24 mg-26 mg oral tablet: 1 tab(s) orally 2 times a day  spironolactone 25 mg oral tablet: 1 tab(s) orally once a day

## 2023-07-15 NOTE — CHART NOTE - NSCHARTNOTEFT_GEN_A_CORE
58 y/o F w PMHx of HTN, T2DM, HLD, noncompliant with home medications presenting with 3 weeks of worsening LE edema, admitted for newly diagnosed systolic heart failure.       Plan:   -ECHO TTE:  Moderately dilated left ventricular cavity size with EF of 30-35%, glocal L ventricular hypokinesis   -C/w Lasix 20mg IVP  -Monitor lytes and replete as necessary  -Doppler of LE: negative   -Trop not impressive, would recommend further evaluation and establish care with cardiology for ischemic workup  -Maintain strict I/O, daily weight  -Repeat ECG, none in chart   -CXR: cardiomegaly noted, hazy b/l interstitial marking indicative for pulmonary edema   -Cardiology consulted       Code: full   Diet: CC/DASH   Activity: independent   VTE PPx: none needed

## 2023-07-15 NOTE — CONSULT NOTE ADULT - ATTENDING COMMENTS
56 y/o woman with HTN, DM, HLD presenting with LE edema with apparently newly diagnosed left ventricular systolic dysfunction.  --LE edema still noted on exam--rec IV diureses with quick switch to PO.  --Keep O>I; can give additional dose of furosemide as rate of urination per patient and daughter has declined.  --Attempt to get records of prior cardiac work-up; patient states she has had echo/stress at OSH.  --Given markedly reduced LV systolic function, likely R&L heart catheterization next week.  --If no obstructive disease, would consider cMR.  --Optimize medical therapy for HF.  --Monitor on telemetry.

## 2023-07-15 NOTE — DISCHARGE NOTE PROVIDER - NSDCCPTREATMENT_GEN_ALL_CORE_FT
PRINCIPAL PROCEDURE  Procedure: Transthoracic echocardiography (TTE)  Findings and Treatment: 1. Moderately dilated left ventricular cavity size. The left ventricular wall thickness is normal. T  < end of copied text >  he left ventricular systolic function is severely decreased with an ejection fraction visually estimated at 30 to 35 %. There is global left ventricular hypokinesis.   2. Left ventricular global longitudinal strain is -5.3 % is abnormal (> -16%). Images were acquired on a Ni ultrasound system and processed using [employer] strain analysis software with a heart rate of 83 bpm and a blood pressure of 163/105 mmHg.   3. There is moderate (grade 2) left ventricular diastolic dysfunction.   4. Normal right ventricular cavity size, normal right ventricular wall thickness and reducedsystolic right ventricular function. The tricuspid annular plane systolic excursion (TAPSE) is 2.1 cm (normal >=1.7 cm).   5. Symmetric mitral valve leaflet tethering.   6. Moderate to severe mitral regurgitation. The mitral regurgitant jet is anteriorly directed.   7. Incomplete leaflet closure of the tricuspid valve due to tethering. Moderate-severe tricuspid regurgitation.   8. Estimated pulmonary artery systolic pressure is 73 mmHg.   9. The inferior vena cava is dilated measuring 2.60 cm in diameter, (dilated >2.1cm) with normal inspiratory collapse (normal >50%) consistent with mildly elevated right atrial pressure (~8, range 5-10mmHg).  10. Trace pericardial effusion with no evidence of hemodynamic compromise.  11. No prior echocardiogram is available for comparison.  < from: TTE W or WO Ultrasound Enhancing Agent (07.14.23 @ 13:54) >       PRINCIPAL PROCEDURE  Procedure: Transthoracic echocardiography (TTE)  Findings and Treatment: 1. Moderately dilated left ventricular cavity size. The left ventricular wall thickness is normal. T  < end of copied text >  he left ventricular systolic function is severely decreased with an ejection fraction visually estimated at 30 to 35 %. There is global left ventricular hypokinesis.   2. Left ventricular global longitudinal strain is -5.3 % is abnormal (> -16%). Images were acquired on a Ni ultrasound system and processed using [employer] strain analysis software with a heart rate of 83 bpm and a blood pressure of 163/105 mmHg.   3. There is moderate (grade 2) left ventricular diastolic dysfunction.   4. Normal right ventricular cavity size, normal right ventricular wall thickness and reducedsystolic right ventricular function. The tricuspid annular plane systolic excursion (TAPSE) is 2.1 cm (normal >=1.7 cm).   5. Symmetric mitral valve leaflet tethering.   6. Moderate to severe mitral regurgitation. The mitral regurgitant jet is anteriorly directed.   7. Incomplete leaflet closure of the tricuspid valve due to tethering. Moderate-severe tricuspid regurgitation.   8. Estimated pulmonary artery systolic pressure is 73 mmHg.   9. The inferior vena cava is dilated measuring 2.60 cm in diameter, (dilated >2.1cm) with normal inspiratory collapse (normal >50%) consistent with mildly elevated right atrial pressure (~8, range 5-10mmHg).  10. Trace pericardial effusion with no evidence of hemodynamic compromise.  11. No prior echocardiogram is available for comparison.  < from: TTE W or WO Ultrasound Enhancing Agent (07.14.23 @ 13:54) >        SECONDARY PROCEDURE  Procedure: Left heart cardiac cath  Findings and Treatment:   Diagnostic Conclusions:   No CAD.   Recommend medical management.   < from: Cardiac Catheterization (07.17.23 @ 16:49) >

## 2023-07-15 NOTE — PROGRESS NOTE ADULT - SUBJECTIVE AND OBJECTIVE BOX
PATIENT: BHUPENDRA ROE, MRN: [id number]    CHIEF COMPLAINT: Patient is a 57y old  Female who presents with a chief complaint of CC: LE swelling (2023 19:03)      INTERVAL HISTORY/OVERNIGHT EVENTS: No overnight events.       MEDICATIONS:  MEDICATIONS  (STANDING):  amLODIPine   Tablet 10 milliGRAM(s) Oral daily  atorvastatin 80 milliGRAM(s) Oral at bedtime  dextrose 5%. 1000 milliLiter(s) (100 mL/Hr) IV Continuous <Continuous>  dextrose 5%. 1000 milliLiter(s) (50 mL/Hr) IV Continuous <Continuous>  dextrose 50% Injectable 25 Gram(s) IV Push once  dextrose 50% Injectable 12.5 Gram(s) IV Push once  dextrose 50% Injectable 25 Gram(s) IV Push once  furosemide   Injectable 20 milliGRAM(s) IV Push daily  glucagon  Injectable 1 milliGRAM(s) IntraMuscular once  insulin lispro (ADMELOG) corrective regimen sliding scale   SubCutaneous three times a day before meals  insulin lispro (ADMELOG) corrective regimen sliding scale   SubCutaneous at bedtime    MEDICATIONS  (PRN):  acetaminophen     Tablet .. 650 milliGRAM(s) Oral every 6 hours PRN Temp greater or equal to 38C (100.4F), Mild Pain (1 - 3)  dextrose Oral Gel 15 Gram(s) Oral once PRN Blood Glucose LESS THAN 70 milliGRAM(s)/deciliter  melatonin 3 milliGRAM(s) Oral at bedtime PRN Insomnia      ALLERGIES: Allergies    No Known Allergies    Intolerances        OBJECTIVE:  ICU Vital Signs Last 24 Hrs  T(C): 36.4 (15 Jul 2023 05:41), Max: 37 (2023 14:47)  T(F): 97.6 (15 Jul 2023 05:41), Max: 98.6 (2023 14:47)  HR: 79 (15 Jul 2023 05:41) (78 - 89)  BP: 148/93 (15 Jul 2023 05:41) (133/83 - 163/105)  BP(mean): 117 (2023 08:28) (117 - 117)  ABP: --  ABP(mean): --  RR: 18 (15 Jul 2023 05:41) (16 - 18)  SpO2: 96% (15 Jul 2023 05:41) (96% - 100%)    O2 Parameters below as of 15 Jul 2023 05:41  Patient On (Oxygen Delivery Method): room air            CAPILLARY BLOOD GLUCOSE      POCT Blood Glucose.: 258 mg/dL (2023 21:26)  POCT Blood Glucose.: 148 mg/dL (2023 17:50)  POCT Blood Glucose.: 139 mg/dL (2023 14:45)  POCT Blood Glucose.: 170 mg/dL (2023 12:01)    CAPILLARY BLOOD GLUCOSE      POCT Blood Glucose.: 258 mg/dL (2023 21:26)    I&O's Summary    2023 07:01  -  15 Jul 2023 06:43  --------------------------------------------------------  IN: 480 mL / OUT: 800 mL / NET: -320 mL      Daily     Daily     PHYSICAL EXAMINATION:  General: Comfortable, no acute distress, cooperative with exam.  HEENT: PERRLA  Respiratory: CTAB, normal respiratory effort, no coughing, wheezes, crackles, or rales.  CV: RRR, S1S2, no murmurs, rubs or gallops. No JVD. Distal pulses intact.  Abdominal: Soft, nontender, nondistended, no rebound or guarding, normal bowel sounds.  Neurology: AOx3, no focal neuro defects, WATTS x 4.  Extremities: No pitting edema, + Peripheral pulses.      LABS:                          13.4   3.53  )-----------( 179      ( 2023 01:27 )             41.6     07-14    142  |  106  |  11  ----------------------------<  204<H>  4.1   |  23  |  0.81    Ca    9.2      2023 01:28  Phos  3.5     -14  Mg     1.6     -14    TPro  6.3  /  Alb  4.1  /  TBili  1.9<H>  /  DBili  x   /  AST  28  /  ALT  17  /  AlkPhos  61  07-14    LIVER FUNCTIONS - ( 2023 01:28 )  Alb: 4.1 g/dL / Pro: 6.3 g/dL / ALK PHOS: 61 U/L / ALT: 17 U/L / AST: 28 U/L / GGT: x                   Urinalysis Basic - ( 2023 01:55 )    Color: Yellow / Appearance: Clear / S.024 / pH: x  Gluc: x / Ketone: Negative  / Bili: Small / Urobili: 8 mg/dL   Blood: x / Protein: 100 mg/dl / Nitrite: Negative   Leuk Esterase: Small / RBC: 4 /hpf / WBC 8 /HPF   Sq Epi: x / Non Sq Epi: x / Bacteria: Negative       PATIENT: BHUPENDRA ROE, MRN: [id number]    CHIEF COMPLAINT: Patient is a 57y old  Female who presents with a chief complaint of CC: LE swelling (2023 19:03)      INTERVAL HISTORY/OVERNIGHT EVENTS: No overnight events. Pt states her leg swelling has improved. Of note pt drinks 4 shots and 5 beers 5/7 days of the week for the past 30-40 years, has never had any withdrawal symptoms or seizures.       MEDICATIONS:  MEDICATIONS  (STANDING):  amLODIPine   Tablet 10 milliGRAM(s) Oral daily  atorvastatin 80 milliGRAM(s) Oral at bedtime  dextrose 5%. 1000 milliLiter(s) (100 mL/Hr) IV Continuous <Continuous>  dextrose 5%. 1000 milliLiter(s) (50 mL/Hr) IV Continuous <Continuous>  dextrose 50% Injectable 25 Gram(s) IV Push once  dextrose 50% Injectable 12.5 Gram(s) IV Push once  dextrose 50% Injectable 25 Gram(s) IV Push once  furosemide   Injectable 20 milliGRAM(s) IV Push daily  glucagon  Injectable 1 milliGRAM(s) IntraMuscular once  insulin lispro (ADMELOG) corrective regimen sliding scale   SubCutaneous three times a day before meals  insulin lispro (ADMELOG) corrective regimen sliding scale   SubCutaneous at bedtime    MEDICATIONS  (PRN):  acetaminophen     Tablet .. 650 milliGRAM(s) Oral every 6 hours PRN Temp greater or equal to 38C (100.4F), Mild Pain (1 - 3)  dextrose Oral Gel 15 Gram(s) Oral once PRN Blood Glucose LESS THAN 70 milliGRAM(s)/deciliter  melatonin 3 milliGRAM(s) Oral at bedtime PRN Insomnia      ALLERGIES: Allergies    No Known Allergies    Intolerances        OBJECTIVE:  ICU Vital Signs Last 24 Hrs  T(C): 36.4 (15 Jul 2023 05:41), Max: 37 (2023 14:47)  T(F): 97.6 (15 Jul 2023 05:41), Max: 98.6 (2023 14:47)  HR: 79 (15 Jul 2023 05:41) (78 - 89)  BP: 148/93 (15 Jul 2023 05:41) (133/83 - 163/105)  BP(mean): 117 (2023 08:28) (117 - 117)  ABP: --  ABP(mean): --  RR: 18 (15 Jul 2023 05:41) (16 - 18)  SpO2: 96% (15 Jul 2023 05:41) (96% - 100%)    O2 Parameters below as of 15 Jul 2023 05:41  Patient On (Oxygen Delivery Method): room air            CAPILLARY BLOOD GLUCOSE      POCT Blood Glucose.: 258 mg/dL (2023 21:26)  POCT Blood Glucose.: 148 mg/dL (2023 17:50)  POCT Blood Glucose.: 139 mg/dL (2023 14:45)  POCT Blood Glucose.: 170 mg/dL (2023 12:01)    CAPILLARY BLOOD GLUCOSE      POCT Blood Glucose.: 258 mg/dL (2023 21:26)    I&O's Summary    2023 07:01  -  15 Jul 2023 06:43  --------------------------------------------------------  IN: 480 mL / OUT: 800 mL / NET: -320 mL      Daily     Daily     PHYSICAL EXAMINATION:  General: Comfortable, no acute distress, cooperative with exam.  HEENT: PERRLA  Respiratory: CTAB, normal respiratory effort, no coughing, wheezes, crackles, or rales.  CV: RRR, S1S2, no murmurs, rubs or gallops. No JVD. Distal pulses intact.  Abdominal: Soft, nontender, nondistended, no rebound or guarding, normal bowel sounds.  Neurology: AOx3, no focal neuro defects, WATTS x 4.  Extremities: + pitting edema to level of shins, improved       LABS:                          13.4   3.53  )-----------( 179      ( 2023 01:27 )             41.6     07-14    142  |  106  |  11  ----------------------------<  204<H>  4.1   |  23  |  0.81    Ca    9.2      2023 01:28  Phos  3.5     07-14  Mg     1.6     07-14    TPro  6.3  /  Alb  4.1  /  TBili  1.9<H>  /  DBili  x   /  AST  28  /  ALT  17  /  AlkPhos  61  07-14    LIVER FUNCTIONS - ( 2023 01:28 )  Alb: 4.1 g/dL / Pro: 6.3 g/dL / ALK PHOS: 61 U/L / ALT: 17 U/L / AST: 28 U/L / GGT: x                   Urinalysis Basic - ( 2023 01:55 )    Color: Yellow / Appearance: Clear / S.024 / pH: x  Gluc: x / Ketone: Negative  / Bili: Small / Urobili: 8 mg/dL   Blood: x / Protein: 100 mg/dl / Nitrite: Negative   Leuk Esterase: Small / RBC: 4 /hpf / WBC 8 /HPF   Sq Epi: x / Non Sq Epi: x / Bacteria: Negative

## 2023-07-15 NOTE — CONSULT NOTE ADULT - ASSESSMENT
56 y/o F w PMHx of HTN, T2DM, HLD presenting with 3 weeks of worsening LE edema found to have new LV dysfunction and acute decompensated heart failure.    1. HFrEF/Decompensated HF - concern for dilated cardiomyopathy in the setting of chronic alcohol use however ischemic disease needs to be ruled out  2. Mod-Severe MR/TR  3. T2DM  4. HLD  5. HTN    RECOMMENDATIONS:  - Recommend LHC/RHC to assess for epicardial coronary disease and right heart filling pressures and hemodynamics  - Continue 20mg IVP Lasix daily goal net negative 1-2L  - Please document accurate I/Os, daily standing weights  - Agree For afterload reduction would start Losartan 25mg daily and titrate up as appropriate  - Will need further titration of GDMT (BB, SGLT2i, ARNI, MRA) during this hospitalization  - Continue Atorvastatin 80mg daily  - Monitor on telemetry  - Keep Mg> 2 and K > 4    Note not final until signed by attending       Kaley Sandoval MD  Cardiology Fellow PGY-6  Phone: [phone]    For all New Consults  [email or web address]   Login: [employer]

## 2023-07-15 NOTE — CONSULT NOTE ADULT - SUBJECTIVE AND OBJECTIVE BOX
Date of Admission:    Patient is a 57y old  Female who presents with a chief complaint of CC: LE swelling (15 Jul 2023 11:31)      HISTORY OF PRESENT ILLNESS:   58 y/o F w PMHx of HTN, T2DM, HLD presenting with 3 weeks of worsening LE edema. Has been having LE edema for the past couple of weeks; LLE swelling worse than RLE. Also admitted to areas of "weeping" fluid 2 weeks ago that has now resolved. She had recent travel to [place] but said swelling had started prior to her trip. She also endorses decreased UOP. Denies any CP, SOB, n/v, abd pain, diarrhea, dysuria, fevers, chills. Presented to the ED for further evaluation of LE edema.   Of note, patient has been noncompliant with her home meds.   In ED, afebrile, HR 85, 176/118, 96% on RA. pBNP 3815, CXR w/ cardiomegaly with mild pulmonary edema. Given lasix 20 IV x1 in ED and admitted to medicine for further management.       Patient reports had has had a low LVEF and was being treated by a Cardiologist at [place] but given her symptoms were improving she stopped following up. She was never prescribed a water pill. No FH of heart failure or MI. Currently has urinated ~2L with IV Lasix which she has recorded. Her weight 6 months ago was 185 lbs. Eats a normal diet not monitoring her sodium intake. Last drink was 2 days ago. No prior history of withdrawals.     Allergies    No Known Allergies    Intolerances    	    MEDICATIONS:  furosemide   Injectable 20 milliGRAM(s) IV Push daily  losartan 25 milliGRAM(s) Oral daily        acetaminophen     Tablet .. 650 milliGRAM(s) Oral every 6 hours PRN  melatonin 3 milliGRAM(s) Oral at bedtime PRN      atorvastatin 80 milliGRAM(s) Oral at bedtime  dextrose 50% Injectable 25 Gram(s) IV Push once  dextrose 50% Injectable 12.5 Gram(s) IV Push once  dextrose 50% Injectable 25 Gram(s) IV Push once  dextrose Oral Gel 15 Gram(s) Oral once PRN  glucagon  Injectable 1 milliGRAM(s) IntraMuscular once  insulin lispro (ADMELOG) corrective regimen sliding scale   SubCutaneous three times a day before meals  insulin lispro (ADMELOG) corrective regimen sliding scale   SubCutaneous at bedtime    chlorhexidine 4% Liquid 1 Application(s) Topical daily  dextrose 5%. 1000 milliLiter(s) IV Continuous <Continuous>  dextrose 5%. 1000 milliLiter(s) IV Continuous <Continuous>      PAST MEDICAL & SURGICAL HISTORY:  Hypertension      Hyperlipidemia      Diabetes      No significant past surgical history          FAMILY HISTORY:  No pertinent family history in first degree relatives        SOCIAL HISTORY:    [X ] Non-smoker  [ ] Smoker  [X ] Alcohol - 5-7 days a week either multiple shots or multiple beers for many years       REVIEW OF SYSTEMS:    CONSTITUTIONAL: No weakness, fevers or chills  EYES/ENT: No visual changes;  No dysphagia  NECK: No pain or stiffness  RESPIRATORY: No wheezing, hemoptysis;   CARDIOVASCULAR: No chest pain or palpitations;   GASTROINTESTINAL: No abdominal or epigastric pain. No nausea, vomiting, or hematemesis; No diarrhea or constipation. No melena or hematochezia.  BACK: No back pain  GENITOURINARY: No dysuria, frequency or hematuria  NEUROLOGICAL: No numbness or weakness  SKIN: No itching, burning, rashes, or lesions   All other review of systems is negative unless indicated above.  PHYSICAL EXAM:  T(C): 36.9 (07-15-23 @ 14:03), Max: 36.9 (07-15-23 @ 14:03)  HR: 80 (07-15-23 @ 14:03) (79 - 81)  BP: 142/87 (07-15-23 @ 14:03) (133/83 - 148/93)  RR: 18 (07-15-23 @ 14:03) (16 - 18)  SpO2: 96% (07-15-23 @ 14:03) (96% - 96%)  Wt(kg): --  I&O's Summary    14 Jul 2023 07:01  -  15 Jul 2023 07:00  --------------------------------------------------------  IN: 480 mL / OUT: 800 mL / NET: -320 mL    15 Jul 2023 07:01  -  15 Jul 2023 15:32  --------------------------------------------------------  IN: 0 mL / OUT: 1620 mL / NET: -1620 mL        Appearance: Normal	  HEENT:   Normal oral mucosa, PERRL, EOMI	  Lymphatic: No lymphadenopathy  Cardiovascular: Normal S1 S2, No JVD, No murmurs, 2+ edema up to knees   Respiratory: Lungs clear to auscultation	  Psychiatry: A & O x 3, Mood & affect appropriate  Gastrointestinal:  Soft, Non-tender, + BS	  Skin: No rashes, No ecchymoses, No cyanosis	  Neurologic: Non-focal  Extremities: Normal range of motion, No clubbing, cyanosis or edema  Vascular: Peripheral pulses palpable 2+ bilaterally        LABS:	 	    CBC Full  -  ( 15 Jul 2023 10:26 )  WBC Count : 2.65 K/uL  Hemoglobin : 13.7 g/dL  Hematocrit : 42.4 %  Platelet Count - Automated : 166 K/uL  Mean Cell Volume : 99.8 fl  Mean Cell Hemoglobin : 32.2 pg  Mean Cell Hemoglobin Concentration : 32.3 gm/dL  Auto Neutrophil # : x  Auto Lymphocyte # : x  Auto Monocyte # : x  Auto Eosinophil # : x  Auto Basophil # : x  Auto Neutrophil % : x  Auto Lymphocyte % : x  Auto Monocyte % : x  Auto Eosinophil % : x  Auto Basophil % : x    07-15    143  |  102  |  8   ----------------------------<  174<H>  3.9   |  28  |  0.76  07-14    142  |  106  |  11  ----------------------------<  204<H>  4.1   |  23  |  0.81    Ca    9.4      15 Jul 2023 10:26  Ca    9.2      14 Jul 2023 01:28  Phos  3.5     07-14  Mg     1.6     07-15  Mg     1.6     07-14    TPro  6.9  /  Alb  3.9  /  TBili  2.7<H>  /  DBili  0.6<H>  /  AST  32  /  ALT  19  /  AlkPhos  60  07-15  TPro  6.3  /  Alb  4.1  /  TBili  1.9<H>  /  DBili  x   /  AST  28  /  ALT  17  /  AlkPhos  61  07-14      proBNP: 3815  Lipid Profile:   HgA1c: 8.3  TSH:       CARDIAC MARKERS:      21 -> 24         ECG:  	NSR with LBBB  RADIOLOGY:  OTHER: 	    PREVIOUS DIAGNOSTIC TESTING:    [X ] Echocardiogram:  TRANSTHORACIC ECHOCARDIOGRAM REPORT  ________________________________________________________________________________                                      _______       Pt. Name:       BHUPENDRA ROE Study Date:    7/14/2023  MRN:            [id number]      YOB: 1965  Accession #:    [id number]       Age:           57 years  Account#:       [id number]    Gender:        F  Heart Rate:     83 bpm          Height:        67.00 in (170.18 cm)  Rhythm:                         Weight:        184.00 lb (83.46 kg)  Blood Pressure: 163/105 mmHg    BSA/BMI:       1.95 m² / 28.82 kg/m²  ________________________________________________________________________________________  Referring Physician:    [id number] Michael Reyez  Interpreting Physician: Charles Walker M.D.  Primary Sonographer:    Josi Valdez RDCS    CPT:               ECHO TTE WO CON COMP W DOPP - 00291.m; MYOCARDIAL STRAIN                     IMAGING - 77828.m  Indication(s):     Dyspnea, unspecified - R06.00  Procedure:         Transthoracic echocardiogram with 2-D, M-mode and complete                     spectral and color flow Doppler.  Ordering Location: [facility], [place]  Study Information: Image quality for this study is fair.    _______________________________________________________________________________________  CONCLUSIONS:      1. Moderately dilated left ventricular cavity size. The left ventricular wall thickness is normal. The left ventricular systolic function is severely decreased with an ejection fraction visually estimated at 30 to 35 %. There is global left ventricular hypokinesis.   2. Left ventricular global longitudinal strain is -5.3 % is abnormal (> -16%). Images were acquired on a Ni ultrasound system and processed using [employer] strain analysis software with a heart rate of 83 bpm and a blood pressure of 163/105 mmHg.   3. There is moderate (grade 2) left ventricular diastolic dysfunction.   4. Normal right ventricular cavity size, normal right ventricular wall thickness and reducedsystolic right ventricular function. The tricuspid annular plane systolic excursion (TAPSE) is 2.1 cm (normal >=1.7 cm).   5. Symmetric mitral valve leaflet tethering.   6. Moderate to severe mitral regurgitation. The mitral regurgitant jet is anteriorly directed.   7. Incomplete leaflet closure of the tricuspid valve due to tethering. Moderate-severe tricuspid regurgitation.   8. Estimated pulmonary artery systolic pressure is 73 mmHg.   9. The inferior vena cava is dilated measuring 2.60 cm in diameter, (dilated >2.1cm) with normal inspiratory collapse (normal >50%) consistent with mildly elevated right atrial pressure (~8, range 5-10mmHg).  10. Trace pericardial effusion with no evidence of hemodynamic compromise.  11. No prior echocardiogram is available for comparison.    ________________________________________________________________________________________  FINDINGS:

## 2023-07-15 NOTE — DISCHARGE NOTE PROVIDER - NSDCFUADDAPPT_GEN_ALL_CORE_FT
Please follow up at the cardiology clinic listed above. Please take your medications as prescribed.

## 2023-07-15 NOTE — PROGRESS NOTE ADULT - PROBLEM SELECTOR PLAN 2
Noncompliant with home medications. Hypertensive on arrival to ED.   -C/w norvasc with holding parameters   -Monitor BP Noncompliant with home medications. Hypertensive on arrival to ED.   -Monitor BP  -started losartan 25qd

## 2023-07-15 NOTE — PROGRESS NOTE ADULT - PROBLEM SELECTOR PLAN 4
Noncompliant with home metformin  -F/u A1C  -Carb consistent diet  -LDSS Noncompliant with home metformin  -A1C 8.3   -Carb consistent diet  -LDSS

## 2023-07-15 NOTE — DISCHARGE NOTE PROVIDER - NSFOLLOWUPCLINICS_GEN_ALL_ED_FT
Cardiology at [facility]  Cardiology  [address] [place] [address]  Phone: [phone]  Fax:   Established Patient  Follow Up Time: 1 week

## 2023-07-15 NOTE — DISCHARGE NOTE PROVIDER - NSDCCPCAREPLAN_GEN_ALL_CORE_FT
PRINCIPAL DISCHARGE DIAGNOSIS  Diagnosis: Acute heart failure  Assessment and Plan of Treatment: You were admitted for lower extremity swelling. You had an ultrasound of your heart which showed that your heart is not pumping properly which is known as heart failure. You were seen by the cardiology team that recommended...  Please return to the hospital if you have symptoms of lower leg swelling, trouble breathing, chest pain, shortness of breath.     PRINCIPAL DISCHARGE DIAGNOSIS  Diagnosis: Acute heart failure  Assessment and Plan of Treatment: You were admitted for lower extremity swelling. You had an ultrasound of your heart which showed that your heart is not pumping properly which is known as heart failure. You were seen by the cardiology team that performed a left heart catheterization which showed you have no coronary artery disease. You were advised to stop drinking alcohol as this can worsen heart function. You were also given several medications to help with your heart function. You should take those medications as prescribed and followup with a cardiologist outpatient.  Please return to the hospital if you have symptoms of lower leg swelling, trouble breathing, chest pain, shortness of breath.

## 2023-07-16 LAB
ANION GAP SERPL CALC-SCNC: 12 MMOL/L — SIGNIFICANT CHANGE UP (ref 5–17)
BASOPHILS # BLD AUTO: 0 K/UL — SIGNIFICANT CHANGE UP (ref 0–0.2)
BASOPHILS NFR BLD AUTO: 0 % — SIGNIFICANT CHANGE UP (ref 0–2)
BUN SERPL-MCNC: 11 MG/DL — SIGNIFICANT CHANGE UP (ref 7–23)
CALCIUM SERPL-MCNC: 9.3 MG/DL — SIGNIFICANT CHANGE UP (ref 8.4–10.5)
CHLORIDE SERPL-SCNC: 102 MMOL/L — SIGNIFICANT CHANGE UP (ref 96–108)
CHOLEST SERPL-MCNC: 135 MG/DL — SIGNIFICANT CHANGE UP
CO2 SERPL-SCNC: 26 MMOL/L — SIGNIFICANT CHANGE UP (ref 22–31)
CREAT SERPL-MCNC: 0.75 MG/DL — SIGNIFICANT CHANGE UP (ref 0.5–1.3)
CULTURE RESULTS: SIGNIFICANT CHANGE UP
EGFR: 92 ML/MIN/1.73M2 — SIGNIFICANT CHANGE UP
EOSINOPHIL # BLD AUTO: 0 K/UL — SIGNIFICANT CHANGE UP (ref 0–0.5)
EOSINOPHIL NFR BLD AUTO: 0 % — SIGNIFICANT CHANGE UP (ref 0–6)
GIANT PLATELETS BLD QL SMEAR: PRESENT — SIGNIFICANT CHANGE UP
GLUCOSE BLDC GLUCOMTR-MCNC: 140 MG/DL — HIGH (ref 70–99)
GLUCOSE BLDC GLUCOMTR-MCNC: 163 MG/DL — HIGH (ref 70–99)
GLUCOSE BLDC GLUCOMTR-MCNC: 174 MG/DL — HIGH (ref 70–99)
GLUCOSE BLDC GLUCOMTR-MCNC: 184 MG/DL — HIGH (ref 70–99)
GLUCOSE SERPL-MCNC: 160 MG/DL — HIGH (ref 70–99)
HCT VFR BLD CALC: 41.7 % — SIGNIFICANT CHANGE UP (ref 34.5–45)
HDLC SERPL-MCNC: 38 MG/DL — LOW
HGB BLD-MCNC: 13.6 G/DL — SIGNIFICANT CHANGE UP (ref 11.5–15.5)
LIPID PNL WITH DIRECT LDL SERPL: 83 MG/DL — SIGNIFICANT CHANGE UP
LYMPHOCYTES # BLD AUTO: 0.74 K/UL — LOW (ref 1–3.3)
LYMPHOCYTES # BLD AUTO: 31.9 % — SIGNIFICANT CHANGE UP (ref 13–44)
MAGNESIUM SERPL-MCNC: 1.7 MG/DL — SIGNIFICANT CHANGE UP (ref 1.6–2.6)
MANUAL SMEAR VERIFICATION: SIGNIFICANT CHANGE UP
MCHC RBC-ENTMCNC: 32.2 PG — SIGNIFICANT CHANGE UP (ref 27–34)
MCHC RBC-ENTMCNC: 32.6 GM/DL — SIGNIFICANT CHANGE UP (ref 32–36)
MCV RBC AUTO: 98.6 FL — SIGNIFICANT CHANGE UP (ref 80–100)
MONOCYTES # BLD AUTO: 0.2 K/UL — SIGNIFICANT CHANGE UP (ref 0–0.9)
MONOCYTES NFR BLD AUTO: 8.8 % — SIGNIFICANT CHANGE UP (ref 2–14)
NEUTROPHILS # BLD AUTO: 1.37 K/UL — LOW (ref 1.8–7.4)
NEUTROPHILS NFR BLD AUTO: 57.5 % — SIGNIFICANT CHANGE UP (ref 43–77)
NEUTS BAND # BLD: 1.8 % — SIGNIFICANT CHANGE UP (ref 0–8)
NON HDL CHOLESTEROL: 98 MG/DL — SIGNIFICANT CHANGE UP
PHOSPHATE SERPL-MCNC: 4 MG/DL — SIGNIFICANT CHANGE UP (ref 2.5–4.5)
PLAT MORPH BLD: NORMAL — SIGNIFICANT CHANGE UP
PLATELET # BLD AUTO: 161 K/UL — SIGNIFICANT CHANGE UP (ref 150–400)
POTASSIUM SERPL-MCNC: 3.7 MMOL/L — SIGNIFICANT CHANGE UP (ref 3.5–5.3)
POTASSIUM SERPL-SCNC: 3.7 MMOL/L — SIGNIFICANT CHANGE UP (ref 3.5–5.3)
RBC # BLD: 4.23 M/UL — SIGNIFICANT CHANGE UP (ref 3.8–5.2)
RBC # FLD: 13.7 % — SIGNIFICANT CHANGE UP (ref 10.3–14.5)
RBC BLD AUTO: SIGNIFICANT CHANGE UP
SODIUM SERPL-SCNC: 140 MMOL/L — SIGNIFICANT CHANGE UP (ref 135–145)
SPECIMEN SOURCE: SIGNIFICANT CHANGE UP
TRIGL SERPL-MCNC: 71 MG/DL — SIGNIFICANT CHANGE UP
WBC # BLD: 2.31 K/UL — LOW (ref 3.8–10.5)
WBC # FLD AUTO: 2.31 K/UL — LOW (ref 3.8–10.5)

## 2023-07-16 PROCEDURE — 99233 SBSQ HOSP IP/OBS HIGH 50: CPT

## 2023-07-16 RX ORDER — SACUBITRIL AND VALSARTAN 24; 26 MG/1; MG/1
1 TABLET, FILM COATED ORAL
Refills: 0 | Status: DISCONTINUED | OUTPATIENT
Start: 2023-07-17 | End: 2023-07-18

## 2023-07-16 RX ORDER — POTASSIUM CHLORIDE 20 MEQ
40 PACKET (EA) ORAL ONCE
Refills: 0 | Status: COMPLETED | OUTPATIENT
Start: 2023-07-16 | End: 2023-07-16

## 2023-07-16 RX ORDER — MAGNESIUM SULFATE 500 MG/ML
2 VIAL (ML) INJECTION ONCE
Refills: 0 | Status: COMPLETED | OUTPATIENT
Start: 2023-07-16 | End: 2023-07-16

## 2023-07-16 RX ORDER — METOPROLOL TARTRATE 50 MG
25 TABLET ORAL DAILY
Refills: 0 | Status: DISCONTINUED | OUTPATIENT
Start: 2023-07-17 | End: 2023-07-18

## 2023-07-16 RX ORDER — FUROSEMIDE 40 MG
20 TABLET ORAL DAILY
Refills: 0 | Status: DISCONTINUED | OUTPATIENT
Start: 2023-07-17 | End: 2023-07-18

## 2023-07-16 RX ADMIN — ATORVASTATIN CALCIUM 80 MILLIGRAM(S): 80 TABLET, FILM COATED ORAL at 21:25

## 2023-07-16 RX ADMIN — CHLORHEXIDINE GLUCONATE 1 APPLICATION(S): 213 SOLUTION TOPICAL at 12:43

## 2023-07-16 RX ADMIN — Medication 0: at 21:57

## 2023-07-16 RX ADMIN — Medication 1: at 17:23

## 2023-07-16 RX ADMIN — Medication 25 GRAM(S): at 12:35

## 2023-07-16 RX ADMIN — Medication 40 MILLIEQUIVALENT(S): at 12:35

## 2023-07-16 RX ADMIN — Medication 1: at 13:11

## 2023-07-16 RX ADMIN — Medication 1: at 09:03

## 2023-07-16 RX ADMIN — LOSARTAN POTASSIUM 25 MILLIGRAM(S): 100 TABLET, FILM COATED ORAL at 05:01

## 2023-07-16 RX ADMIN — Medication 20 MILLIGRAM(S): at 05:01

## 2023-07-16 RX ADMIN — Medication 650 MILLIGRAM(S): at 22:20

## 2023-07-16 RX ADMIN — Medication 650 MILLIGRAM(S): at 21:25

## 2023-07-16 NOTE — PROGRESS NOTE ADULT - PROBLEM SELECTOR PLAN 2
Noncompliant with home medications. Hypertensive on arrival to ED.   -Monitor BP  -started losartan 25qd

## 2023-07-16 NOTE — PROGRESS NOTE ADULT - PROBLEM SELECTOR PLAN 1
3 weeks of worsening B/L LE edema. No CP, SOB. CXR with mild pulm edema.  pBNP 3000s. Trop neg.   -LE duplex neg for DVT   -S/p Lasix 20 IV x1 in ED  -Monitor I/O; daily weight - net neg 1.6L   -TTE: EF 30-35%, global akinesis   -continue lasix 20 IV daily  -f/u EKG  -f/u heart failure team recs

## 2023-07-16 NOTE — PROGRESS NOTE ADULT - ASSESSMENT
no 58 y/o F w PMHx of HTN, T2DM, HLD, noncompliant with home medications presenting with 3 weeks of worsening LE edema with TTE showing EF of 30-35%, admitted for new heart failure.  no

## 2023-07-16 NOTE — PROGRESS NOTE ADULT - ASSESSMENT
56 y/o F w PMHx of HTN, T2DM, HLD presenting with 3 weeks of worsening LE edema found to have new LV dysfunction and acute decompensated heart failure.    1. HFrEF/Decompensated HF - concern for dilated cardiomyopathy in the setting of chronic alcohol use however ischemic disease needs to be ruled out  2. Mod-Severe MR/TR  3. T2DM  4. HLD  5. HTN      On exam today patient is warm, well perfused, mildly volume overloaded.   RECOMMENDATIONS:  - Pending LHC/RHC   - Continue 20mg IVP Lasix daily goal net negative 1-2L. Transition to PO tm.   - Please document accurate I/Os, daily standing weights  - Can change Losartan 25mg to Entresto 24/26   - Can start Metop XL 25mg  - Will need further titration of GDMT (BB, SGLT2i, ARNI, MRA) during this hospitalization  - Continue Atorvastatin 80mg daily  - Monitor on telemetry  - Keep Mg> 2 and K > 4

## 2023-07-16 NOTE — PROGRESS NOTE ADULT - SUBJECTIVE AND OBJECTIVE BOX
Patient is a 57y old  Female who presents with a chief complaint of CC: LE swelling (15 Jul 2023 15:31)      SUBJECTIVE / OVERNIGHT EVENTS:    MEDICATIONS  (STANDING):  atorvastatin 80 milliGRAM(s) Oral at bedtime  chlorhexidine 4% Liquid 1 Application(s) Topical daily  dextrose 5%. 1000 milliLiter(s) (100 mL/Hr) IV Continuous <Continuous>  dextrose 5%. 1000 milliLiter(s) (50 mL/Hr) IV Continuous <Continuous>  dextrose 50% Injectable 25 Gram(s) IV Push once  dextrose 50% Injectable 25 Gram(s) IV Push once  dextrose 50% Injectable 12.5 Gram(s) IV Push once  furosemide   Injectable 20 milliGRAM(s) IV Push daily  glucagon  Injectable 1 milliGRAM(s) IntraMuscular once  insulin lispro (ADMELOG) corrective regimen sliding scale   SubCutaneous at bedtime  insulin lispro (ADMELOG) corrective regimen sliding scale   SubCutaneous three times a day before meals  losartan 25 milliGRAM(s) Oral daily    MEDICATIONS  (PRN):  acetaminophen     Tablet .. 650 milliGRAM(s) Oral every 6 hours PRN Temp greater or equal to 38C (100.4F), Mild Pain (1 - 3)  dextrose Oral Gel 15 Gram(s) Oral once PRN Blood Glucose LESS THAN 70 milliGRAM(s)/deciliter  melatonin 3 milliGRAM(s) Oral at bedtime PRN Insomnia      Vital Signs Last 24 Hrs  T(C): 36.6 (16 Jul 2023 04:59), Max: 36.9 (15 Jul 2023 14:03)  T(F): 97.8 (16 Jul 2023 04:59), Max: 98.5 (15 Jul 2023 14:03)  HR: 77 (16 Jul 2023 04:59) (77 - 85)  BP: 153/100 (16 Jul 2023 04:59) (142/87 - 153/100)  BP(mean): --  RR: 18 (16 Jul 2023 04:59) (17 - 18)  SpO2: 99% (16 Jul 2023 04:59) (96% - 99%)    Parameters below as of 16 Jul 2023 04:59  Patient On (Oxygen Delivery Method): room air      CAPILLARY BLOOD GLUCOSE      POCT Blood Glucose.: 193 mg/dL (15 Jul 2023 22:03)  POCT Blood Glucose.: 153 mg/dL (15 Jul 2023 17:42)  POCT Blood Glucose.: 147 mg/dL (15 Jul 2023 13:52)  POCT Blood Glucose.: 148 mg/dL (15 Jul 2023 08:56)    I&O's Summary    15 Jul 2023 07:01  -  16 Jul 2023 07:00  --------------------------------------------------------  IN: 0 mL / OUT: 2020 mL / NET: -2020 mL        PHYSICAL EXAM:  GENERAL: NAD, well-developed  HEAD:  Atraumatic, Normocephalic  EYES: EOMI, PERRLA, conjunctiva and sclera clear  NECK: Supple, No JVD  CHEST/LUNG: Clear to auscultation bilaterally; No wheeze  HEART: Regular rate and rhythm; No murmurs, rubs, or gallops  ABDOMEN: Soft, Nontender, Nondistended; Bowel sounds present  EXTREMITIES:  2+ Peripheral Pulses, No clubbing, cyanosis, or edema  PSYCH: AAOx3  NEUROLOGY: non-focal  SKIN: No rashes or lesions    LABS:                        13.7   2.65  )-----------( 166      ( 15 Jul 2023 10:26 )             42.4     07-15    143  |  102  |  8   ----------------------------<  174<H>  3.9   |  28  |  0.76    Ca    9.4      15 Jul 2023 10:26  Mg     1.6     07-15    TPro  6.9  /  Alb  3.9  /  TBili  2.7<H>  /  DBili  0.6<H>  /  AST  32  /  ALT  19  /  AlkPhos  60  07-15          Urinalysis Basic - ( 15 Jul 2023 10:26 )    Color: x / Appearance: x / SG: x / pH: x  Gluc: 174 mg/dL / Ketone: x  / Bili: x / Urobili: x   Blood: x / Protein: x / Nitrite: x   Leuk Esterase: x / RBC: x / WBC x   Sq Epi: x / Non Sq Epi: x / Bacteria: x        RADIOLOGY & ADDITIONAL TESTS:    Imaging Personally Reviewed:    Consultant(s) Notes Reviewed:      Care Discussed with Consultants/Other Providers:   Patient is a 57y old  Female who presents with a chief complaint of CC: LE swelling (15 Jul 2023 15:31)      SUBJECTIVE / OVERNIGHT EVENTS: No acute events overnight. Pt seen and examined at bedisde this morning. Pt reports doing well and in good spirits. Denies fever, headache, nausea, vomiting, CP, SOB, changes in BM or dysuria.     Brief Daily Plan  - F/u Cardiology recs  - Plan for [facility] next week    MEDICATIONS  (STANDING):  atorvastatin 80 milliGRAM(s) Oral at bedtime  chlorhexidine 4% Liquid 1 Application(s) Topical daily  dextrose 5%. 1000 milliLiter(s) (100 mL/Hr) IV Continuous <Continuous>  dextrose 5%. 1000 milliLiter(s) (50 mL/Hr) IV Continuous <Continuous>  dextrose 50% Injectable 25 Gram(s) IV Push once  dextrose 50% Injectable 25 Gram(s) IV Push once  dextrose 50% Injectable 12.5 Gram(s) IV Push once  furosemide   Injectable 20 milliGRAM(s) IV Push daily  glucagon  Injectable 1 milliGRAM(s) IntraMuscular once  insulin lispro (ADMELOG) corrective regimen sliding scale   SubCutaneous at bedtime  insulin lispro (ADMELOG) corrective regimen sliding scale   SubCutaneous three times a day before meals  losartan 25 milliGRAM(s) Oral daily    MEDICATIONS  (PRN):  acetaminophen     Tablet .. 650 milliGRAM(s) Oral every 6 hours PRN Temp greater or equal to 38C (100.4F), Mild Pain (1 - 3)  dextrose Oral Gel 15 Gram(s) Oral once PRN Blood Glucose LESS THAN 70 milliGRAM(s)/deciliter  melatonin 3 milliGRAM(s) Oral at bedtime PRN Insomnia      Vital Signs Last 24 Hrs  T(C): 36.6 (16 Jul 2023 04:59), Max: 36.9 (15 Jul 2023 14:03)  T(F): 97.8 (16 Jul 2023 04:59), Max: 98.5 (15 Jul 2023 14:03)  HR: 77 (16 Jul 2023 04:59) (77 - 85)  BP: 153/100 (16 Jul 2023 04:59) (142/87 - 153/100)  BP(mean): --  RR: 18 (16 Jul 2023 04:59) (17 - 18)  SpO2: 99% (16 Jul 2023 04:59) (96% - 99%)    Parameters below as of 16 Jul 2023 04:59  Patient On (Oxygen Delivery Method): room air      CAPILLARY BLOOD GLUCOSE      POCT Blood Glucose.: 193 mg/dL (15 Jul 2023 22:03)  POCT Blood Glucose.: 153 mg/dL (15 Jul 2023 17:42)  POCT Blood Glucose.: 147 mg/dL (15 Jul 2023 13:52)  POCT Blood Glucose.: 148 mg/dL (15 Jul 2023 08:56)    I&O's Summary    15 Jul 2023 07:01  -  16 Jul 2023 07:00  --------------------------------------------------------  IN: 0 mL / OUT: 2020 mL / NET: -2020 mL        PHYSICAL EXAM:  GENERAL: NAD, well-developed  HEAD:  Atraumatic, Normocephalic  EYES: EOMI, PERRLA, conjunctiva and sclera clear  NECK: Supple, No JVD  CHEST/LUNG: Clear to auscultation bilaterally; No wheeze  HEART: Regular rate and rhythm; No murmurs, rubs, or gallops  ABDOMEN: Soft, Nontender, Nondistended; Bowel sounds present  EXTREMITIES:  2+ Peripheral Pulses, No clubbing, cyanosis, or edema  PSYCH: AAOx3  NEUROLOGY: non-focal  SKIN: No rashes or lesions    LABS:                        13.7   2.65  )-----------( 166      ( 15 Jul 2023 10:26 )             42.4     07-15    143  |  102  |  8   ----------------------------<  174<H>  3.9   |  28  |  0.76    Ca    9.4      15 Jul 2023 10:26  Mg     1.6     07-15    TPro  6.9  /  Alb  3.9  /  TBili  2.7<H>  /  DBili  0.6<H>  /  AST  32  /  ALT  19  /  AlkPhos  60  07-15          Urinalysis Basic - ( 15 Jul 2023 10:26 )    Color: x / Appearance: x / SG: x / pH: x  Gluc: 174 mg/dL / Ketone: x  / Bili: x / Urobili: x   Blood: x / Protein: x / Nitrite: x   Leuk Esterase: x / RBC: x / WBC x   Sq Epi: x / Non Sq Epi: x / Bacteria: x        RADIOLOGY & ADDITIONAL TESTS:    Imaging Personally Reviewed:    Consultant(s) Notes Reviewed:      Care Discussed with Consultants/Other Providers:

## 2023-07-16 NOTE — PROGRESS NOTE ADULT - SUBJECTIVE AND OBJECTIVE BOX
Patient seen and examined at bedside.    Overnight Events:  NAEON.   No tele  No concerns or complaints this am. Reports improved CAROL.     REVIEW OF SYSTEMS:  CONSTITUTIONAL: No weakness, fevers or chills  EYES/ENT: No visual changes;  No dysphagia  NECK: No pain or stiffness  RESPIRATORY: No cough, wheezing, hemoptysis; No shortness of breath  CARDIOVASCULAR: No chest pain or palpitations; improving lower extremity edema  GASTROINTESTINAL: No abdominal or epigastric pain. No nausea, vomiting, or hematemesis; No diarrhea or constipation. No melena or hematochezia.  BACK: No back pain  GENITOURINARY: No dysuria, frequency or hematuria  NEUROLOGICAL: No numbness or weakness  SKIN: No itching, burning, rashes, or lesions   All other review of systems is negative unless indicated above.      Current Meds:  acetaminophen     Tablet .. 650 milliGRAM(s) Oral every 6 hours PRN  atorvastatin 80 milliGRAM(s) Oral at bedtime  chlorhexidine 4% Liquid 1 Application(s) Topical daily  dextrose 5%. 1000 milliLiter(s) IV Continuous <Continuous>  dextrose 5%. 1000 milliLiter(s) IV Continuous <Continuous>  dextrose 50% Injectable 25 Gram(s) IV Push once  dextrose 50% Injectable 25 Gram(s) IV Push once  dextrose 50% Injectable 12.5 Gram(s) IV Push once  dextrose Oral Gel 15 Gram(s) Oral once PRN  furosemide   Injectable 20 milliGRAM(s) IV Push daily  glucagon  Injectable 1 milliGRAM(s) IntraMuscular once  insulin lispro (ADMELOG) corrective regimen sliding scale   SubCutaneous at bedtime  insulin lispro (ADMELOG) corrective regimen sliding scale   SubCutaneous three times a day before meals  losartan 25 milliGRAM(s) Oral daily  melatonin 3 milliGRAM(s) Oral at bedtime PRN      PAST MEDICAL & SURGICAL HISTORY:  Hypertension      Hyperlipidemia      Diabetes      No significant past surgical history          Vitals:  T(F): 97.8 (-), Max: 98.5 (-15)  HR: 77 () (77 - 85)  BP: 153/100 () (142/87 - 153/100)  RR: 18 (-)  SpO2: 99% ()  I&O's Summary    15 Jul 2023 07:01  -  2023 07:00  --------------------------------------------------------  IN: 0 mL / OUT: 2020 mL / NET: - mL    2023 07:01  -  2023 09:27  --------------------------------------------------------  IN: 0 mL / OUT: 1420 mL / NET: -1420 mL        Physical Exam:  Appearance: No acute distress; well appearing  Eyes: PERRL, EOMI, pink conjunctiva  HENT: Normal oral mucosa  Cardiovascular: RRR, S1, S2, no murmurs, rubs, or gallops; mild LE edema  Respiratory: Clear to auscultation bilaterally  Gastrointestinal: soft, non-tender, non-distended with normal bowel sounds  Musculoskeletal: No clubbing; no joint deformity   Neurologic: Non-focal  Lymphatic: No lymphadenopathy  Psychiatry: AAOx3, mood & affect appropriate  Skin: No rashes, ecchymoses, or cyanosis                          13.7   2.65  )-----------( 166      ( 15 Jul 2023 10:26 )             42.4     07-15    143  |  102  |  8   ----------------------------<  174<H>  3.9   |  28  |  0.76    Ca    9.4      15 Jul 2023 10:26  Mg     1.6     07-15    TPro  6.9  /  Alb  3.9  /  TBili  2.7<H>  /  DBili  0.6<H>  /  AST  32  /  ALT  19  /  AlkPhos  60  07-15            Total Cholesterol: 132  LDL: --  HDL: 41  T               Pt. Name:       BHUPENDRA ROE Study Date:    2023  MRN:            [id number]      YOB: 1965  Accession #:    [id number]       Age:           57 years  Account#:       [id number]    Gender:        F  Heart Rate:     83 bpm          Height:        67.00 in (170.18 cm)  Rhythm:                         Weight:        184.00 lb (83.46 kg)  Blood Pressure: 163/105 mmHg    BSA/BMI:       1.95 m² / 28.82 kg/m²  ________________________________________________________________________________________  Referring Physician:    [id number] Michael Reyez  Interpreting Physician: Charles Walker M.D.  Primary Sonographer:    Josi Valdez RDCS    CPT:               ECHO TTE WO CON COMP W DOPP - 08769.m; MYOCARDIAL STRAIN                     IMAGING - 36185.m  Indication(s):     Dyspnea, unspecified - R06.00  Procedure:         Transthoracic echocardiogram with 2-D, M-mode and complete                     spectral and color flow Doppler.  Ordering Location: [facility]  Study Information: Image quality for this study is fair.    _______________________________________________________________________________________  CONCLUSIONS:      1. Moderately dilated left ventricular cavity size. The left ventricular wall thickness is normal. The left ventricular systolic function is severely decreased with an ejection fraction visually estimated at 30 to 35 %. There is global left ventricular hypokinesis.   2. Left ventricular global longitudinal strain is -5.3 % is abnormal (> -16%). Images were acquired on a [employer] ultrasound system and processed using [employer] strain analysis software with a heart rate of 83 bpm and a blood pressure of 163/105 mmHg.   3. There is moderate (grade 2) left ventricular diastolic dysfunction.   4. Normal right ventricular cavity size, normal right ventricular wall thickness and reducedsystolic right ventricular function. The tricuspid annular plane systolic excursion (TAPSE) is 2.1 cm (normal >=1.7 cm).   5. Symmetric mitral valve leaflet tethering.   6. Moderate to severe mitral regurgitation. The mitral regurgitant jet is anteriorly directed.   7. Incomplete leaflet closure of the tricuspid valve due to tethering. Moderate-severe tricuspid regurgitation.   8. Estimated pulmonary artery systolic pressure is 73 mmHg.   9. The inferior vena cava is dilated measuring 2.60 cm in diameter, (dilated >2.1cm) with normal inspiratory collapse (normal >50%) consistent with mildly elevated right atrial pressure (~8, range 5-10mmHg).  10. Trace pericardial effusion with no evidence of hemodynamic compromise.  11. No prior echocardiogram is available for comparison.     Patient seen and examined at bedside.    Overnight Events:  NAEON.   No tele  No concerns or complaints this am. Reports improved CAROL.     REVIEW OF SYSTEMS:  CONSTITUTIONAL: No weakness, fevers or chills  EYES/ENT: No visual changes;  No dysphagia  NECK: No pain or stiffness  RESPIRATORY: No cough, wheezing, hemoptysis; No shortness of breath  CARDIOVASCULAR: No chest pain or palpitations; improving lower extremity edema  GASTROINTESTINAL: No abdominal or epigastric pain. No nausea, vomiting, or hematemesis; No diarrhea or constipation. No melena or hematochezia.  BACK: No back pain  GENITOURINARY: No dysuria, frequency or hematuria  NEUROLOGICAL: No numbness or weakness  SKIN: No itching, burning, rashes, or lesions   All other review of systems is negative unless indicated above.      Current Meds:  acetaminophen     Tablet .. 650 milliGRAM(s) Oral every 6 hours PRN  atorvastatin 80 milliGRAM(s) Oral at bedtime  chlorhexidine 4% Liquid 1 Application(s) Topical daily  dextrose 5%. 1000 milliLiter(s) IV Continuous <Continuous>  dextrose 5%. 1000 milliLiter(s) IV Continuous <Continuous>  dextrose 50% Injectable 25 Gram(s) IV Push once  dextrose 50% Injectable 25 Gram(s) IV Push once  dextrose 50% Injectable 12.5 Gram(s) IV Push once  dextrose Oral Gel 15 Gram(s) Oral once PRN  furosemide   Injectable 20 milliGRAM(s) IV Push daily  glucagon  Injectable 1 milliGRAM(s) IntraMuscular once  insulin lispro (ADMELOG) corrective regimen sliding scale   SubCutaneous at bedtime  insulin lispro (ADMELOG) corrective regimen sliding scale   SubCutaneous three times a day before meals  losartan 25 milliGRAM(s) Oral daily  melatonin 3 milliGRAM(s) Oral at bedtime PRN      PAST MEDICAL & SURGICAL HISTORY:  Hypertension      Hyperlipidemia      Diabetes      No significant past surgical history          Vitals:  T(F): 97.8 (-), Max: 98.5 (-15)  HR: 77 () (77 - 85)  BP: 153/100 () (142/87 - 153/100)  RR: 18 (-)  SpO2: 99% ()  I&O's Summary    15 Jul 2023 07:01  -  2023 07:00  --------------------------------------------------------  IN: 0 mL / OUT: 2020 mL / NET: - mL    2023 07:01  -  2023 09:27  --------------------------------------------------------  IN: 0 mL / OUT: 1420 mL / NET: -1420 mL        Physical Exam:  Appearance: No acute distress; well appearing  Eyes: PERRL, EOMI, pink conjunctiva  HENT: Normal oral mucosa  Cardiovascular: RRR, S1, S2, no murmurs, rubs, or gallops; mild LE edema  Respiratory: Clear to auscultation bilaterally  Gastrointestinal: soft, non-tender, non-distended with normal bowel sounds  Musculoskeletal: No clubbing; no joint deformity   Neurologic: Non-focal  Lymphatic: No lymphadenopathy  Psychiatry: AAOx3, mood & affect appropriate  Skin: No rashes, ecchymoses, or cyanosis                          13.7   2.65  )-----------( 166      ( 15 Jul 2023 10:26 )             42.4     07-15    143  |  102  |  8   ----------------------------<  174<H>  3.9   |  28  |  0.76    Ca    9.4      15 Jul 2023 10:26  Mg     1.6     07-15    TPro  6.9  /  Alb  3.9  /  TBili  2.7<H>  /  DBili  0.6<H>  /  AST  32  /  ALT  19  /  AlkPhos  60  07-15            Total Cholesterol: 132  LDL: --  HDL: 41  T               Pt. Name:       BHUPENDRA ROE Study Date:    2023  MRN:            [id number]      YOB: 1965  Accession #:    [id number]       Age:           57 years  Account#:       [id number]    Gender:        F  Heart Rate:     83 bpm          Height:        67.00 in (170.18 cm)  Rhythm:                         Weight:        184.00 lb (83.46 kg)  Blood Pressure: 163/105 mmHg    BSA/BMI:       1.95 m² / 28.82 kg/m²  ________________________________________________________________________________________  Referring Physician:    [id number] Michael Reyez  Interpreting Physician: Charles Walker M.D.  Primary Sonographer:    Josi Valdez RDCS    CPT:               ECHO TTE WO CON COMP W DOPP - 43738.m; MYOCARDIAL STRAIN                     IMAGING - 83904.m  Indication(s):     Dyspnea, unspecified - R06.00  Procedure:         Transthoracic echocardiogram with 2-D, M-mode and complete                     spectral and color flow Doppler.  Ordering Location: [facility]  Study Information: Image quality for this study is fair.    _______________________________________________________________________________________  CONCLUSIONS:      1. Moderately dilated left ventricular cavity size. The left ventricular wall thickness is normal. The left ventricular systolic function is severely decreased with an ejection fraction visually estimated at 30 to 35 %. There is global left ventricular hypokinesis.   2. Left ventricular global longitudinal strain is -5.3 % is abnormal (> -16%). Images were acquired on a [employer] ultrasound system and processed using [employer] strain analysis software with a heart rate of 83 bpm and a blood pressure of 163/105 mmHg.   3. There is moderate (grade 2) left ventricular diastolic dysfunction.   4. Normal right ventricular cavity size, normal right ventricular wall thickness and reducedsystolic right ventricular function. The tricuspid annular plane systolic excursion (TAPSE) is 2.1 cm (normal >=1.7 cm).   5. Symmetric mitral valve leaflet tethering.   6. Moderate to severe mitral regurgitation. The mitral regurgitant jet is anteriorly directed.   7. Incomplete leaflet closure of the tricuspid valve due to tethering. Moderate-severe tricuspid regurgitation.   8. Estimated pulmonary artery systolic pressure is 73 mmHg.   9. The inferior vena cava is dilated measuring 2.60 cm in diameter, (dilated >2.1cm) with normal inspiratory collapse (normal >50%) consistent with mildly elevated right atrial pressure (~8, range 5-10mmHg).  10. Trace pericardial effusion with no evidence of hemodynamic compromise.  11. No prior echocardiogram is available for comparison.

## 2023-07-17 LAB
ANION GAP SERPL CALC-SCNC: 13 MMOL/L — SIGNIFICANT CHANGE UP (ref 5–17)
BASOPHILS # BLD AUTO: 0.01 K/UL — SIGNIFICANT CHANGE UP (ref 0–0.2)
BASOPHILS NFR BLD AUTO: 0.4 % — SIGNIFICANT CHANGE UP (ref 0–2)
BUN SERPL-MCNC: 18 MG/DL — SIGNIFICANT CHANGE UP (ref 7–23)
CALCIUM SERPL-MCNC: 9.5 MG/DL — SIGNIFICANT CHANGE UP (ref 8.4–10.5)
CHLORIDE SERPL-SCNC: 102 MMOL/L — SIGNIFICANT CHANGE UP (ref 96–108)
CO2 SERPL-SCNC: 25 MMOL/L — SIGNIFICANT CHANGE UP (ref 22–31)
CREAT SERPL-MCNC: 0.95 MG/DL — SIGNIFICANT CHANGE UP (ref 0.5–1.3)
EGFR: 69 ML/MIN/1.73M2 — SIGNIFICANT CHANGE UP
EOSINOPHIL # BLD AUTO: 0.05 K/UL — SIGNIFICANT CHANGE UP (ref 0–0.5)
EOSINOPHIL NFR BLD AUTO: 2 % — SIGNIFICANT CHANGE UP (ref 0–6)
GLUCOSE BLDC GLUCOMTR-MCNC: 136 MG/DL — HIGH (ref 70–99)
GLUCOSE BLDC GLUCOMTR-MCNC: 148 MG/DL — HIGH (ref 70–99)
GLUCOSE BLDC GLUCOMTR-MCNC: 176 MG/DL — HIGH (ref 70–99)
GLUCOSE BLDC GLUCOMTR-MCNC: 193 MG/DL — HIGH (ref 70–99)
GLUCOSE SERPL-MCNC: 138 MG/DL — HIGH (ref 70–99)
HCT VFR BLD CALC: 40.3 % — SIGNIFICANT CHANGE UP (ref 34.5–45)
HGB BLD-MCNC: 13.5 G/DL — SIGNIFICANT CHANGE UP (ref 11.5–15.5)
IMM GRANULOCYTES NFR BLD AUTO: 0.4 % — SIGNIFICANT CHANGE UP (ref 0–0.9)
LYMPHOCYTES # BLD AUTO: 1.01 K/UL — SIGNIFICANT CHANGE UP (ref 1–3.3)
LYMPHOCYTES # BLD AUTO: 40.7 % — SIGNIFICANT CHANGE UP (ref 13–44)
MAGNESIUM SERPL-MCNC: 2 MG/DL — SIGNIFICANT CHANGE UP (ref 1.6–2.6)
MCHC RBC-ENTMCNC: 33.1 PG — SIGNIFICANT CHANGE UP (ref 27–34)
MCHC RBC-ENTMCNC: 33.5 GM/DL — SIGNIFICANT CHANGE UP (ref 32–36)
MCV RBC AUTO: 98.8 FL — SIGNIFICANT CHANGE UP (ref 80–100)
MONOCYTES # BLD AUTO: 0.18 K/UL — SIGNIFICANT CHANGE UP (ref 0–0.9)
MONOCYTES NFR BLD AUTO: 7.3 % — SIGNIFICANT CHANGE UP (ref 2–14)
NEUTROPHILS # BLD AUTO: 1.22 K/UL — LOW (ref 1.8–7.4)
NEUTROPHILS NFR BLD AUTO: 49.2 % — SIGNIFICANT CHANGE UP (ref 43–77)
NRBC # BLD: 0 /100 WBCS — SIGNIFICANT CHANGE UP (ref 0–0)
PHOSPHATE SERPL-MCNC: 4.2 MG/DL — SIGNIFICANT CHANGE UP (ref 2.5–4.5)
PLATELET # BLD AUTO: 162 K/UL — SIGNIFICANT CHANGE UP (ref 150–400)
POTASSIUM SERPL-MCNC: 4.1 MMOL/L — SIGNIFICANT CHANGE UP (ref 3.5–5.3)
POTASSIUM SERPL-SCNC: 4.1 MMOL/L — SIGNIFICANT CHANGE UP (ref 3.5–5.3)
RBC # BLD: 4.08 M/UL — SIGNIFICANT CHANGE UP (ref 3.8–5.2)
RBC # FLD: 13.6 % — SIGNIFICANT CHANGE UP (ref 10.3–14.5)
SODIUM SERPL-SCNC: 140 MMOL/L — SIGNIFICANT CHANGE UP (ref 135–145)
WBC # BLD: 2.48 K/UL — LOW (ref 3.8–10.5)
WBC # FLD AUTO: 2.48 K/UL — LOW (ref 3.8–10.5)

## 2023-07-17 PROCEDURE — 93454 CORONARY ARTERY ANGIO S&I: CPT | Mod: 26

## 2023-07-17 PROCEDURE — 99152 MOD SED SAME PHYS/QHP 5/>YRS: CPT

## 2023-07-17 PROCEDURE — 99233 SBSQ HOSP IP/OBS HIGH 50: CPT

## 2023-07-17 PROCEDURE — 99232 SBSQ HOSP IP/OBS MODERATE 35: CPT | Mod: GC

## 2023-07-17 RX ADMIN — SACUBITRIL AND VALSARTAN 1 TABLET(S): 24; 26 TABLET, FILM COATED ORAL at 21:25

## 2023-07-17 RX ADMIN — Medication 650 MILLIGRAM(S): at 21:25

## 2023-07-17 RX ADMIN — CHLORHEXIDINE GLUCONATE 1 APPLICATION(S): 213 SOLUTION TOPICAL at 12:00

## 2023-07-17 RX ADMIN — Medication 20 MILLIGRAM(S): at 05:06

## 2023-07-17 RX ADMIN — Medication 25 MILLIGRAM(S): at 05:06

## 2023-07-17 RX ADMIN — ATORVASTATIN CALCIUM 80 MILLIGRAM(S): 80 TABLET, FILM COATED ORAL at 21:26

## 2023-07-17 RX ADMIN — Medication 650 MILLIGRAM(S): at 22:25

## 2023-07-17 RX ADMIN — SACUBITRIL AND VALSARTAN 1 TABLET(S): 24; 26 TABLET, FILM COATED ORAL at 05:06

## 2023-07-17 RX ADMIN — Medication 0: at 21:26

## 2023-07-17 NOTE — PROGRESS NOTE ADULT - TIME BILLING
chart reviewing, history taking, physical exam, assessment and documentation, including speaking to specialist/SW/CM regarding the management.

## 2023-07-17 NOTE — PROGRESS NOTE ADULT - ASSESSMENT
58 y/o F w PMHx of HTN, T2DM, HLD presenting with 3 weeks of worsening LE edema found to have new LV dysfunction and acute decompensated heart failure, scheduled for Left and possibly R cardiac cath 7/17 to determine extent of occlusion.     1. HFrEF/Decompensated HF - concern for dilated cardiomyopathy in the setting of chronic alcohol use however ischemic disease needs to be ruled out  2. Mod-Severe MR/TR  3. T2DM  4. HLD  5. HTN      RECOMMENDATIONS:  - Pending LHC/RHC   - Change 20mg PO lasix to 40mg PO lasix  - Please document accurate I/Os, daily standing weights  - C/w Entresto 24/26   - C/w Metop XL 25mg  - Will need further titration of GDMT (BB, SGLT2i, ARNI, MRA) during this hospitalization  - C/w Atorvastatin 80mg daily  - Monitor on telemetry  - Keep Mg> 2 and K > 4     58 y/o F w PMHx of HTN, T2DM, HLD presenting with 3 weeks of worsening LE edema found to have new LV dysfunction and acute decompensated heart failure, scheduled for Left and possibly R cardiac cath 7/17 to determine extent of occlusion.     1. HFrEF/Decompensated HF - concern for dilated cardiomyopathy in the setting of chronic alcohol use however ischemic disease needs to be ruled out  2. Mod-Severe MR/TR  3. T2DM  4. HLD  5. HTN      RECOMMENDATIONS:  - Pending C for ischemic myopathy rule out with addn'l RHC   - Please document accurate I/Os, daily standing weights  - C/w 20mg PO lasix  - C/w Entresto 24/26   - C/w Metop XL 25mg  - C/w Atorvastatin 80mg daily  - Will need further titration of GDMT (BB, SGLT2i, ARNI, MRA) during this hospitalization, likely post cath  - Monitor on telemetry  - Keep Mg> 2 and K > 4    Note not final until attending cosignature      58 y/o F w PMHx of HTN, T2DM, HLD presenting with 3 weeks of worsening LE edema found to have new LV dysfunction and acute decompensated heart failure, scheduled for Left and possibly R cardiac cath 7/17 to determine extent of occlusion.     1. HFrEF/Decompensated HF - concern for dilated cardiomyopathy in the setting of chronic alcohol use however ischemic disease needs to be ruled out  2. Mod-Severe MR/TR  3. T2DM  4. HLD  5. HTN      RECOMMENDATIONS:  - Pending [facility] for ischemic myopathy rule out   - Please document accurate I/Os, daily standing weights  - C/w 20mg PO lasix  - C/w Entresto 24/26   - C/w Metop XL 25mg  - C/w Atorvastatin 80mg daily  - Will need further titration of GDMT (BB, SGLT2i, ARNI, MRA) during this hospitalization, likely post cath  - Monitor on telemetry  - Keep Mg> 2 and K > 4    Note not final until attending cosignature

## 2023-07-17 NOTE — PROGRESS NOTE ADULT - PROBLEM SELECTOR PLAN 2
Noncompliant with home medications. Hypertensive on arrival to ED.   -Monitor BP  -started losartan 25qd Noncompliant with home medications. Hypertensive on arrival to ED.   -Monitor BP  -GDMT

## 2023-07-17 NOTE — PROGRESS NOTE ADULT - PROBLEM SELECTOR PLAN 3
Noncompliant with home medications  -C/w statin   -f/u lipid panel Noncompliant with home medications  -C/w statin

## 2023-07-17 NOTE — PROGRESS NOTE ADULT - SUBJECTIVE AND OBJECTIVE BOX
PATIENT: BHUPENDRA ROE, MRN: [id number]    CHIEF COMPLAINT: Patient is a 57y old  Female who presents with a chief complaint of CC: LE swelling (2023 09:27)      INTERVAL HISTORY/OVERNIGHT EVENTS: No overnight events.       MEDICATIONS:  MEDICATIONS  (STANDING):  atorvastatin 80 milliGRAM(s) Oral at bedtime  chlorhexidine 4% Liquid 1 Application(s) Topical daily  dextrose 5%. 1000 milliLiter(s) (50 mL/Hr) IV Continuous <Continuous>  dextrose 5%. 1000 milliLiter(s) (100 mL/Hr) IV Continuous <Continuous>  dextrose 50% Injectable 25 Gram(s) IV Push once  dextrose 50% Injectable 25 Gram(s) IV Push once  dextrose 50% Injectable 12.5 Gram(s) IV Push once  furosemide    Tablet 20 milliGRAM(s) Oral daily  glucagon  Injectable 1 milliGRAM(s) IntraMuscular once  insulin lispro (ADMELOG) corrective regimen sliding scale   SubCutaneous at bedtime  insulin lispro (ADMELOG) corrective regimen sliding scale   SubCutaneous three times a day before meals  metoprolol succinate ER 25 milliGRAM(s) Oral daily  sacubitril 24 mG/valsartan 26 mG 1 Tablet(s) Oral two times a day    MEDICATIONS  (PRN):  acetaminophen     Tablet .. 650 milliGRAM(s) Oral every 6 hours PRN Temp greater or equal to 38C (100.4F), Mild Pain (1 - 3)  dextrose Oral Gel 15 Gram(s) Oral once PRN Blood Glucose LESS THAN 70 milliGRAM(s)/deciliter  melatonin 3 milliGRAM(s) Oral at bedtime PRN Insomnia      ALLERGIES: Allergies    No Known Allergies    Intolerances    Tea (Urticaria (Mild to Mod))      OBJECTIVE:  ICU Vital Signs Last 24 Hrs  T(C): 36.6 (2023 05:33), Max: 36.7 (2023 13:06)  T(F): 97.8 (2023 05:33), Max: 98.1 (2023 20:35)  HR: 85 (2023 05:33) (81 - 85)  BP: 149/97 (2023 05:33) (136/95 - 152/94)  BP(mean): --  ABP: --  ABP(mean): --  RR: 18 (2023 05:33) (18 - 18)  SpO2: 97% (2023 05:33) (97% - 99%)    O2 Parameters below as of 2023 05:33  Patient On (Oxygen Delivery Method): room air            CAPILLARY BLOOD GLUCOSE      POCT Blood Glucose.: 140 mg/dL (2023 21:44)  POCT Blood Glucose.: 184 mg/dL (2023 17:19)  POCT Blood Glucose.: 174 mg/dL (2023 12:58)  POCT Blood Glucose.: 163 mg/dL (2023 08:39)    CAPILLARY BLOOD GLUCOSE      POCT Blood Glucose.: 140 mg/dL (2023 21:44)    I&O's Summary    15 Jul 2023 07:01  -  2023 07:00  --------------------------------------------------------  IN: 0 mL / OUT: 2020 mL / NET: -2020 mL    2023 07:01  -  2023 06:54  --------------------------------------------------------  IN: 600 mL / OUT: 2042 mL / NET: -1442 mL      Daily     Daily Weight in k.4 (2023 13:01)    PHYSICAL EXAMINATION:  General: Comfortable, no acute distress, cooperative with exam.  HEENT: PERRLA  Respiratory: CTAB, normal respiratory effort, no coughing, wheezes, crackles, or rales.  CV: RRR, S1S2, no murmurs, rubs or gallops. No JVD. Distal pulses intact.  Abdominal: Soft, nontender, nondistended, no rebound or guarding, normal bowel sounds.  Neurology: AOx3, no focal neuro defects, WATTS x 4.  Extremities: No pitting edema, + Peripheral pulses.      LABS:                          13.6   2.31  )-----------( 161      ( 2023 10:37 )             41.7     07-16    140  |  102  |  11  ----------------------------<  160<H>  3.7   |  26  |  0.75    Ca    9.3      2023 10:37  Phos  4.0     07-16  Mg     1.7     07-16    TPro  6.9  /  Alb  3.9  /  TBili  2.7<H>  /  DBili  0.6<H>  /  AST  32  /  ALT  19  /  AlkPhos  60  07-15    LIVER FUNCTIONS - ( 15 Jul 2023 10:26 )  Alb: 3.9 g/dL / Pro: 6.9 g/dL / ALK PHOS: 60 U/L / ALT: 19 U/L / AST: 32 U/L / GGT: x                   Urinalysis Basic - ( 2023 10:37 )    Color: x / Appearance: x / SG: x / pH: x  Gluc: 160 mg/dL / Ketone: x  / Bili: x / Urobili: x   Blood: x / Protein: x / Nitrite: x   Leuk Esterase: x / RBC: x / WBC x   Sq Epi: x / Non Sq Epi: x / Bacteria: x       PATIENT: BHUPENDRA ROE, MRN: [id number]    CHIEF COMPLAINT: Patient is a 57y old  Female who presents with a chief complaint of CC: LE swelling (2023 09:27)      INTERVAL HISTORY/OVERNIGHT EVENTS: No overnight events. Pt says leg swelling is still improving, no other complaints      MEDICATIONS:  MEDICATIONS  (STANDING):  atorvastatin 80 milliGRAM(s) Oral at bedtime  chlorhexidine 4% Liquid 1 Application(s) Topical daily  dextrose 5%. 1000 milliLiter(s) (50 mL/Hr) IV Continuous <Continuous>  dextrose 5%. 1000 milliLiter(s) (100 mL/Hr) IV Continuous <Continuous>  dextrose 50% Injectable 25 Gram(s) IV Push once  dextrose 50% Injectable 25 Gram(s) IV Push once  dextrose 50% Injectable 12.5 Gram(s) IV Push once  furosemide    Tablet 20 milliGRAM(s) Oral daily  glucagon  Injectable 1 milliGRAM(s) IntraMuscular once  insulin lispro (ADMELOG) corrective regimen sliding scale   SubCutaneous at bedtime  insulin lispro (ADMELOG) corrective regimen sliding scale   SubCutaneous three times a day before meals  metoprolol succinate ER 25 milliGRAM(s) Oral daily  sacubitril 24 mG/valsartan 26 mG 1 Tablet(s) Oral two times a day    MEDICATIONS  (PRN):  acetaminophen     Tablet .. 650 milliGRAM(s) Oral every 6 hours PRN Temp greater or equal to 38C (100.4F), Mild Pain (1 - 3)  dextrose Oral Gel 15 Gram(s) Oral once PRN Blood Glucose LESS THAN 70 milliGRAM(s)/deciliter  melatonin 3 milliGRAM(s) Oral at bedtime PRN Insomnia      ALLERGIES: Allergies    No Known Allergies    Intolerances    Tea (Urticaria (Mild to Mod))      OBJECTIVE:  ICU Vital Signs Last 24 Hrs  T(C): 36.6 (2023 05:33), Max: 36.7 (2023 13:06)  T(F): 97.8 (2023 05:33), Max: 98.1 (2023 20:35)  HR: 85 (2023 05:33) (81 - 85)  BP: 149/97 (2023 05:33) (136/95 - 152/94)  BP(mean): --  ABP: --  ABP(mean): --  RR: 18 (2023 05:33) (18 - 18)  SpO2: 97% (2023 05:33) (97% - 99%)    O2 Parameters below as of 2023 05:33  Patient On (Oxygen Delivery Method): room air            CAPILLARY BLOOD GLUCOSE      POCT Blood Glucose.: 140 mg/dL (2023 21:44)  POCT Blood Glucose.: 184 mg/dL (2023 17:19)  POCT Blood Glucose.: 174 mg/dL (2023 12:58)  POCT Blood Glucose.: 163 mg/dL (2023 08:39)    CAPILLARY BLOOD GLUCOSE      POCT Blood Glucose.: 140 mg/dL (2023 21:44)    I&O's Summary    15 Jul 2023 07:01  -  2023 07:00  --------------------------------------------------------  IN: 0 mL / OUT: 2020 mL / NET: -2020 mL    2023 07:01  -  2023 06:54  --------------------------------------------------------  IN: 600 mL / OUT: 2042 mL / NET: -1442 mL      Daily     Daily Weight in k.4 (2023 13:01)    PHYSICAL EXAMINATION:  General: Comfortable, no acute distress, cooperative with exam.  HEENT: PERRLA  Respiratory: CTAB, normal respiratory effort, no coughing, wheezes, crackles, or rales.  CV: RRR, S1S2, no murmurs, rubs or gallops. No JVD. Distal pulses intact.  Abdominal: Soft, nontender, nondistended, no rebound or guarding, normal bowel sounds.  Neurology: AOx3, no focal neuro defects, WATTS x 4.  Extremities: pitting edema b/l     LABS:                          13.6   2.31  )-----------( 161      ( 2023 10:37 )             41.7     07-16    140  |  102  |  11  ----------------------------<  160<H>  3.7   |  26  |  0.75    Ca    9.3      2023 10:37  Phos  4.0     07-16  Mg     1.7     07-16    TPro  6.9  /  Alb  3.9  /  TBili  2.7<H>  /  DBili  0.6<H>  /  AST  32  /  ALT  19  /  AlkPhos  60  07-15    LIVER FUNCTIONS - ( 15 Jul 2023 10:26 )  Alb: 3.9 g/dL / Pro: 6.9 g/dL / ALK PHOS: 60 U/L / ALT: 19 U/L / AST: 32 U/L / GGT: x                   Urinalysis Basic - ( 2023 10:37 )    Color: x / Appearance: x / SG: x / pH: x  Gluc: 160 mg/dL / Ketone: x  / Bili: x / Urobili: x   Blood: x / Protein: x / Nitrite: x   Leuk Esterase: x / RBC: x / WBC x   Sq Epi: x / Non Sq Epi: x / Bacteria: x

## 2023-07-17 NOTE — PROGRESS NOTE ADULT - SUBJECTIVE AND OBJECTIVE BOX
Patient seen and examined at bedside.    Overnight Events: No overnight Events     Subjective: No Tele. Feels anxious about procedure (cath) today but not concerned. Family also at bedside.    REVIEW OF SYSTEMS:  CONSTITUTIONAL: No weakness, fevers or chills  EYES/ENT: No visual changes;  No dysphagia  NECK: No pain or stiffness  RESPIRATORY: No cough, wheezing, hemoptysis; No shortness of breath  CARDIOVASCULAR: No chest pain or palpitations; improving lower extremity edema  GASTROINTESTINAL: No abdominal or epigastric pain. No nausea, vomiting, or hematemesis; No diarrhea or constipation. No melena or hematochezia.  BACK: No back pain  NEUROLOGICAL: No numbness or weakness  SKIN: No itching, burning, rashes, or lesions   All other review of systems is negative unless indicated above.    MEDICATIONS  (STANDING):  atorvastatin 80 milliGRAM(s) Oral at bedtime  chlorhexidine 4% Liquid 1 Application(s) Topical daily  dextrose 5%. 1000 milliLiter(s) (50 mL/Hr) IV Continuous <Continuous>  dextrose 5%. 1000 milliLiter(s) (100 mL/Hr) IV Continuous <Continuous>  dextrose 50% Injectable 25 Gram(s) IV Push once  dextrose 50% Injectable 25 Gram(s) IV Push once  dextrose 50% Injectable 12.5 Gram(s) IV Push once  furosemide    Tablet 20 milliGRAM(s) Oral daily  glucagon  Injectable 1 milliGRAM(s) IntraMuscular once  insulin lispro (ADMELOG) corrective regimen sliding scale   SubCutaneous three times a day before meals  insulin lispro (ADMELOG) corrective regimen sliding scale   SubCutaneous at bedtime  metoprolol succinate ER 25 milliGRAM(s) Oral daily  sacubitril 24 mG/valsartan 26 mG 1 Tablet(s) Oral two times a day    MEDICATIONS  (PRN):  acetaminophen     Tablet .. 650 milliGRAM(s) Oral every 6 hours PRN Temp greater or equal to 38C (100.4F), Mild Pain (1 - 3)  dextrose Oral Gel 15 Gram(s) Oral once PRN Blood Glucose LESS THAN 70 milliGRAM(s)/deciliter  melatonin 3 milliGRAM(s) Oral at bedtime PRN Insomnia        PAST MEDICAL & SURGICAL HISTORY:  Hypertension      Hyperlipidemia      Diabetes      No significant past surgical history          Vital Signs Last 24 Hrs  T(C): 36.5 (17 Jul 2023 14:03), Max: 36.7 (16 Jul 2023 20:35)  T(F): 97.7 (17 Jul 2023 14:03), Max: 98.1 (16 Jul 2023 20:35)  HR: 81 (17 Jul 2023 14:03) (81 - 85)  BP: 163/90 (17 Jul 2023 14:03) (136/95 - 163/90)  BP(mean): --  RR: 16 (17 Jul 2023 14:03) (16 - 18)  SpO2: 98% (17 Jul 2023 14:03) (97% - 99%)    Parameters below as of 17 Jul 2023 14:03  Patient On (Oxygen Delivery Method): room air    I&O's Summary    16 Jul 2023 07:01  -  17 Jul 2023 07:00  --------------------------------------------------------  IN: 600 mL / OUT: 2460 mL / NET: -1860 mL    17 Jul 2023 07:01  -  17 Jul 2023 14:08  --------------------------------------------------------  IN: 720 mL / OUT: 800 mL / NET: -80 mL        Physical Exam:  Appearance: No acute distress; well appearing, sitting upright in chair   Eyes: PERRL, EOMI, pink conjunctiva  HEENT: Normal oral mucosa  Cardiovascular: RRR, S1, S2, no murmurs, rubs, or gallops; mild LE edema  Respiratory: Clear to auscultation bilaterally  Gastrointestinal: soft, non-tender, non-distended  Musculoskeletal: No clubbing; no joint deformity   Neurologic: No focal deficits   Psychiatry: AAOx3, mood is nervous & affect is congruent  Skin: No rashes, ecchymoses, or cyanosis, skin is tight around R leg and blanching but not pitting edema     07-17    140  |  102  |  18  ----------------------------<  138<H>  4.1   |  25  |  0.95    Ca    9.5      17 Jul 2023 07:13  Phos  4.2     07-17  Mg     2.0     07-17       Pt. Name:       BHUPENDRA ROE Study Date:    7/14/2023  MRN:            [id number]      YOB: 1965  Accession #:    [id number]       Age:           57 years  Account#:       [id number]    Gender:        F  Heart Rate:     83 bpm          Height:        67.00 in (170.18 cm)  Rhythm:                         Weight:        184.00 lb (83.46 kg)  Blood Pressure: 163/105 mmHg    BSA/BMI:       1.95 m² / 28.82 kg/m²  ________________________________________________________________________________________  Referring Physician:    [id number] Michael Reyez  Interpreting Physician: Charles Walker M.D.  Primary Sonographer:    Josi Valdez [facility]    CPT:               ECHO TTE WO CON COMP W DOPP - 15857.m; MYOCARDIAL STRAIN                     IMAGING - 78621.m  Indication(s):     Dyspnea, unspecified - R06.00  Procedure:         Transthoracic echocardiogram with 2-D, M-mode and complete                     spectral and color flow Doppler.  Ordering Location: [facility]  Study Information: Image quality for this study is fair.    _______________________________________________________________________________________  CONCLUSIONS:      1. Moderately dilated left ventricular cavity size. The left ventricular wall thickness is normal. The left ventricular systolic function is severely decreased with an ejection fraction visually estimated at 30 to 35 %. There is global left ventricular hypokinesis.   2. Left ventricular global longitudinal strain is -5.3 % is abnormal (> -16%). Images were acquired on a Ni ultrasound system and processed using [employer] strain analysis software with a heart rate of 83 bpm and a blood pressure of 163/105 mmHg.   3. There is moderate (grade 2) left ventricular diastolic dysfunction.   4. Normal right ventricular cavity size, normal right ventricular wall thickness and reducedsystolic right ventricular function. The tricuspid annular plane systolic excursion (TAPSE) is 2.1 cm (normal >=1.7 cm).   5. Symmetric mitral valve leaflet tethering.   6. Moderate to severe mitral regurgitation. The mitral regurgitant jet is anteriorly directed.   7. Incomplete leaflet closure of the tricuspid valve due to tethering. Moderate-severe tricuspid regurgitation.   8. Estimated pulmonary artery systolic pressure is 73 mmHg.   9. The inferior vena cava is dilated measuring 2.60 cm in diameter, (dilated >2.1cm) with normal inspiratory collapse (normal >50%) consistent with mildly elevated right atrial pressure (~8, range 5-10mmHg).  10. Trace pericardial effusion with no evidence of hemodynamic compromise.  11. No prior echocardiogram is available for comparison.     Patient seen and examined at bedside.    Overnight Events: No overnight Events     Subjective: No Tele. Feels anxious about procedure (cath) today but not concerned. Family also at bedside.    REVIEW OF SYSTEMS:  CONSTITUTIONAL: No weakness, fevers or chills  EYES/ENT: No visual changes;  No dysphagia  NECK: No pain or stiffness  RESPIRATORY: No cough, wheezing, hemoptysis; No shortness of breath  CARDIOVASCULAR: No chest pain or palpitations; improving lower extremity edema  GASTROINTESTINAL: No abdominal or epigastric pain. No nausea, vomiting, or hematemesis; No diarrhea or constipation. No melena or hematochezia.  BACK: No back pain  NEUROLOGICAL: No numbness or weakness  SKIN: No itching, burning, rashes, or lesions   All other review of systems is negative unless indicated above.    MEDICATIONS  (STANDING):  atorvastatin 80 milliGRAM(s) Oral at bedtime  chlorhexidine 4% Liquid 1 Application(s) Topical daily  dextrose 5%. 1000 milliLiter(s) (50 mL/Hr) IV Continuous <Continuous>  dextrose 5%. 1000 milliLiter(s) (100 mL/Hr) IV Continuous <Continuous>  dextrose 50% Injectable 25 Gram(s) IV Push once  dextrose 50% Injectable 25 Gram(s) IV Push once  dextrose 50% Injectable 12.5 Gram(s) IV Push once  furosemide    Tablet 20 milliGRAM(s) Oral daily  glucagon  Injectable 1 milliGRAM(s) IntraMuscular once  insulin lispro (ADMELOG) corrective regimen sliding scale   SubCutaneous three times a day before meals  insulin lispro (ADMELOG) corrective regimen sliding scale   SubCutaneous at bedtime  metoprolol succinate ER 25 milliGRAM(s) Oral daily  sacubitril 24 mG/valsartan 26 mG 1 Tablet(s) Oral two times a day    MEDICATIONS  (PRN):  acetaminophen     Tablet .. 650 milliGRAM(s) Oral every 6 hours PRN Temp greater or equal to 38C (100.4F), Mild Pain (1 - 3)  dextrose Oral Gel 15 Gram(s) Oral once PRN Blood Glucose LESS THAN 70 milliGRAM(s)/deciliter  melatonin 3 milliGRAM(s) Oral at bedtime PRN Insomnia        PAST MEDICAL & SURGICAL HISTORY:  Hypertension      Hyperlipidemia      Diabetes      No significant past surgical history          Vital Signs Last 24 Hrs  T(C): 36.5 (17 Jul 2023 14:03), Max: 36.7 (16 Jul 2023 20:35)  T(F): 97.7 (17 Jul 2023 14:03), Max: 98.1 (16 Jul 2023 20:35)  HR: 81 (17 Jul 2023 14:03) (81 - 85)  BP: 163/90 (17 Jul 2023 14:03) (136/95 - 163/90)  BP(mean): --  RR: 16 (17 Jul 2023 14:03) (16 - 18)  SpO2: 98% (17 Jul 2023 14:03) (97% - 99%)    Parameters below as of 17 Jul 2023 14:03  Patient On (Oxygen Delivery Method): room air    I&O's Summary    16 Jul 2023 07:01  -  17 Jul 2023 07:00  --------------------------------------------------------  IN: 600 mL / OUT: 2460 mL / NET: -1860 mL    17 Jul 2023 07:01  -  17 Jul 2023 14:08  --------------------------------------------------------  IN: 720 mL / OUT: 800 mL / NET: -80 mL        Physical Exam:  Appearance: No acute distress; well appearing, sitting upright in chair   Eyes: PERRL, EOMI, pink conjunctiva  HEENT: Normal oral mucosa  Cardiovascular: RRR, S1, S2, no murmurs, rubs, or gallops; mild LE edema L>R  Respiratory: Clear to auscultation bilaterally  Gastrointestinal: soft, non-tender, non-distended  Musculoskeletal: No clubbing; no joint deformity   Neurologic: No focal deficits   Psychiatry: AAOx3, mood is nervous & affect is congruent  Skin: No rashes, ecchymoses, or cyanosis, skin is tight around R leg and blanching but not pitting edema     07-17    140  |  102  |  18  ----------------------------<  138<H>  4.1   |  25  |  0.95    Ca    9.5      17 Jul 2023 07:13  Phos  4.2     07-17  Mg     2.0     07-17       Pt. Name:       BHUPENDRA ROE Study Date:    7/14/2023  MRN:            [id number]      YOB: 1965  Accession #:    [id number]       Age:           57 years  Account#:       [id number]    Gender:        F  Heart Rate:     83 bpm          Height:        67.00 in (170.18 cm)  Rhythm:                         Weight:        184.00 lb (83.46 kg)  Blood Pressure: 163/105 mmHg    BSA/BMI:       1.95 m² / 28.82 kg/m²  ________________________________________________________________________________________  Referring Physician:    [id number] Michael Reyez  Interpreting Physician: Charles Walker M.D.  Primary Sonographer:    Josi Valdez [facility]    CPT:               ECHO TTE WO CON COMP W DOPP - 13188.m; MYOCARDIAL STRAIN                     IMAGING - 16962.m  Indication(s):     Dyspnea, unspecified - R06.00  Procedure:         Transthoracic echocardiogram with 2-D, M-mode and complete                     spectral and color flow Doppler.  Ordering Location: [facility]  Study Information: Image quality for this study is fair.    _______________________________________________________________________________________  CONCLUSIONS:      1. Moderately dilated left ventricular cavity size. The left ventricular wall thickness is normal. The left ventricular systolic function is severely decreased with an ejection fraction visually estimated at 30 to 35 %. There is global left ventricular hypokinesis.   2. Left ventricular global longitudinal strain is -5.3 % is abnormal (> -16%). Images were acquired on a Ni ultrasound system and processed using [employer] strain analysis software with a heart rate of 83 bpm and a blood pressure of 163/105 mmHg.   3. There is moderate (grade 2) left ventricular diastolic dysfunction.   4. Normal right ventricular cavity size, normal right ventricular wall thickness and reducedsystolic right ventricular function. The tricuspid annular plane systolic excursion (TAPSE) is 2.1 cm (normal >=1.7 cm).   5. Symmetric mitral valve leaflet tethering.   6. Moderate to severe mitral regurgitation. The mitral regurgitant jet is anteriorly directed.   7. Incomplete leaflet closure of the tricuspid valve due to tethering. Moderate-severe tricuspid regurgitation.   8. Estimated pulmonary artery systolic pressure is 73 mmHg.   9. The inferior vena cava is dilated measuring 2.60 cm in diameter, (dilated >2.1cm) with normal inspiratory collapse (normal >50%) consistent with mildly elevated right atrial pressure (~8, range 5-10mmHg).  10. Trace pericardial effusion with no evidence of hemodynamic compromise.  11. No prior echocardiogram is available for comparison.

## 2023-07-17 NOTE — PROGRESS NOTE ADULT - PROBLEM SELECTOR PLAN 1
3 weeks of worsening B/L LE edema. No CP, SOB. CXR with mild pulm edema.  pBNP 3000s. Trop neg.   -LE duplex neg for DVT   -S/p Lasix 20 IV x1 in ED  -Monitor I/O; daily weight - net neg 1.6L   -TTE: EF 30-35%, global akinesis   -continue lasix 20 IV daily  per cardio, pending LHC/RHC to r/o ischemic dz 3 weeks of worsening B/L LE edema. No CP, SOB. CXR with mild pulm edema.  pBNP 3000s. Trop neg.   -LE duplex neg for DVT   -S/p Lasix 20 IV x1 in ED  -Monitor I/O; daily weight - net neg 1.6L   -TTE: EF 30-35%, global akinesis   -continue lasix 20 PO daily  - [facility] 7/17    GDMT  -Entresto 24/26  -Toprol 25

## 2023-07-17 NOTE — PROGRESS NOTE ADULT - ASSESSMENT
56 y/o F w PMHx of HTN, T2DM, HLD, noncompliant with home medications presenting with 3 weeks of worsening LE edema with TTE showing EF of 30-35%, admitted for new heart failure.

## 2023-07-18 VITALS
DIASTOLIC BLOOD PRESSURE: 85 MMHG | OXYGEN SATURATION: 99 % | SYSTOLIC BLOOD PRESSURE: 138 MMHG | TEMPERATURE: 98 F | RESPIRATION RATE: 18 BRPM | HEART RATE: 82 BPM

## 2023-07-18 LAB
ANION GAP SERPL CALC-SCNC: 12 MMOL/L — SIGNIFICANT CHANGE UP (ref 5–17)
BASOPHILS # BLD AUTO: 0.02 K/UL — SIGNIFICANT CHANGE UP (ref 0–0.2)
BASOPHILS NFR BLD AUTO: 0.7 % — SIGNIFICANT CHANGE UP (ref 0–2)
BUN SERPL-MCNC: 12 MG/DL — SIGNIFICANT CHANGE UP (ref 7–23)
CALCIUM SERPL-MCNC: 8.9 MG/DL — SIGNIFICANT CHANGE UP (ref 8.4–10.5)
CHLORIDE SERPL-SCNC: 104 MMOL/L — SIGNIFICANT CHANGE UP (ref 96–108)
CO2 SERPL-SCNC: 24 MMOL/L — SIGNIFICANT CHANGE UP (ref 22–31)
CREAT SERPL-MCNC: 0.74 MG/DL — SIGNIFICANT CHANGE UP (ref 0.5–1.3)
EGFR: 94 ML/MIN/1.73M2 — SIGNIFICANT CHANGE UP
EOSINOPHIL # BLD AUTO: 0.06 K/UL — SIGNIFICANT CHANGE UP (ref 0–0.5)
EOSINOPHIL NFR BLD AUTO: 2.2 % — SIGNIFICANT CHANGE UP (ref 0–6)
GLUCOSE BLDC GLUCOMTR-MCNC: 168 MG/DL — HIGH (ref 70–99)
GLUCOSE BLDC GLUCOMTR-MCNC: 199 MG/DL — HIGH (ref 70–99)
GLUCOSE BLDC GLUCOMTR-MCNC: 206 MG/DL — HIGH (ref 70–99)
GLUCOSE SERPL-MCNC: 160 MG/DL — HIGH (ref 70–99)
HCT VFR BLD CALC: 44.1 % — SIGNIFICANT CHANGE UP (ref 34.5–45)
HGB BLD-MCNC: 14.4 G/DL — SIGNIFICANT CHANGE UP (ref 11.5–15.5)
LYMPHOCYTES # BLD AUTO: 1.14 K/UL — SIGNIFICANT CHANGE UP (ref 1–3.3)
LYMPHOCYTES # BLD AUTO: 41.9 % — SIGNIFICANT CHANGE UP (ref 13–44)
MAGNESIUM SERPL-MCNC: 1.7 MG/DL — SIGNIFICANT CHANGE UP (ref 1.6–2.6)
MCHC RBC-ENTMCNC: 32.3 PG — SIGNIFICANT CHANGE UP (ref 27–34)
MCHC RBC-ENTMCNC: 32.7 GM/DL — SIGNIFICANT CHANGE UP (ref 32–36)
MCV RBC AUTO: 98.9 FL — SIGNIFICANT CHANGE UP (ref 80–100)
MONOCYTES # BLD AUTO: 0.2 K/UL — SIGNIFICANT CHANGE UP (ref 0–0.9)
MONOCYTES NFR BLD AUTO: 7.4 % — SIGNIFICANT CHANGE UP (ref 2–14)
NEUTROPHILS # BLD AUTO: 1.3 K/UL — LOW (ref 1.8–7.4)
NEUTROPHILS NFR BLD AUTO: 47.8 % — SIGNIFICANT CHANGE UP (ref 43–77)
NRBC # BLD: 0 /100 WBCS — SIGNIFICANT CHANGE UP (ref 0–0)
PHOSPHATE SERPL-MCNC: 3.8 MG/DL — SIGNIFICANT CHANGE UP (ref 2.5–4.5)
PLATELET # BLD AUTO: 171 K/UL — SIGNIFICANT CHANGE UP (ref 150–400)
POTASSIUM SERPL-MCNC: 3.9 MMOL/L — SIGNIFICANT CHANGE UP (ref 3.5–5.3)
POTASSIUM SERPL-SCNC: 3.9 MMOL/L — SIGNIFICANT CHANGE UP (ref 3.5–5.3)
RBC # BLD: 4.46 M/UL — SIGNIFICANT CHANGE UP (ref 3.8–5.2)
RBC # FLD: 13.6 % — SIGNIFICANT CHANGE UP (ref 10.3–14.5)
SODIUM SERPL-SCNC: 140 MMOL/L — SIGNIFICANT CHANGE UP (ref 135–145)
WBC # BLD: 2.72 K/UL — LOW (ref 3.8–10.5)
WBC # FLD AUTO: 2.72 K/UL — LOW (ref 3.8–10.5)

## 2023-07-18 PROCEDURE — 36415 COLL VENOUS BLD VENIPUNCTURE: CPT

## 2023-07-18 PROCEDURE — C1769: CPT

## 2023-07-18 PROCEDURE — 86803 HEPATITIS C AB TEST: CPT

## 2023-07-18 PROCEDURE — 83735 ASSAY OF MAGNESIUM: CPT

## 2023-07-18 PROCEDURE — 87086 URINE CULTURE/COLONY COUNT: CPT

## 2023-07-18 PROCEDURE — 83036 HEMOGLOBIN GLYCOSYLATED A1C: CPT

## 2023-07-18 PROCEDURE — 83880 ASSAY OF NATRIURETIC PEPTIDE: CPT

## 2023-07-18 PROCEDURE — 71046 X-RAY EXAM CHEST 2 VIEWS: CPT

## 2023-07-18 PROCEDURE — 93306 TTE W/DOPPLER COMPLETE: CPT

## 2023-07-18 PROCEDURE — 80061 LIPID PANEL: CPT

## 2023-07-18 PROCEDURE — 99239 HOSP IP/OBS DSCHRG MGMT >30: CPT

## 2023-07-18 PROCEDURE — 80076 HEPATIC FUNCTION PANEL: CPT

## 2023-07-18 PROCEDURE — 85027 COMPLETE CBC AUTOMATED: CPT

## 2023-07-18 PROCEDURE — 84484 ASSAY OF TROPONIN QUANT: CPT

## 2023-07-18 PROCEDURE — 93454 CORONARY ARTERY ANGIO S&I: CPT

## 2023-07-18 PROCEDURE — 99285 EMERGENCY DEPT VISIT HI MDM: CPT

## 2023-07-18 PROCEDURE — 93005 ELECTROCARDIOGRAM TRACING: CPT

## 2023-07-18 PROCEDURE — 93356 MYOCRD STRAIN IMG SPCKL TRCK: CPT

## 2023-07-18 PROCEDURE — 84100 ASSAY OF PHOSPHORUS: CPT

## 2023-07-18 PROCEDURE — C1887: CPT

## 2023-07-18 PROCEDURE — 81001 URINALYSIS AUTO W/SCOPE: CPT

## 2023-07-18 PROCEDURE — 99233 SBSQ HOSP IP/OBS HIGH 50: CPT

## 2023-07-18 PROCEDURE — 87640 STAPH A DNA AMP PROBE: CPT

## 2023-07-18 PROCEDURE — 93970 EXTREMITY STUDY: CPT

## 2023-07-18 PROCEDURE — 80053 COMPREHEN METABOLIC PANEL: CPT

## 2023-07-18 PROCEDURE — 80048 BASIC METABOLIC PNL TOTAL CA: CPT

## 2023-07-18 PROCEDURE — 96374 THER/PROPH/DIAG INJ IV PUSH: CPT

## 2023-07-18 PROCEDURE — 85025 COMPLETE CBC W/AUTO DIFF WBC: CPT

## 2023-07-18 PROCEDURE — 87641 MR-STAPH DNA AMP PROBE: CPT

## 2023-07-18 PROCEDURE — C1894: CPT

## 2023-07-18 PROCEDURE — 82962 GLUCOSE BLOOD TEST: CPT

## 2023-07-18 RX ORDER — SACUBITRIL AND VALSARTAN 24; 26 MG/1; MG/1
1 TABLET, FILM COATED ORAL
Qty: 60 | Refills: 0
Start: 2023-07-18 | End: 2023-08-16

## 2023-07-18 RX ORDER — METFORMIN HYDROCHLORIDE 850 MG/1
1 TABLET ORAL
Qty: 60 | Refills: 0
Start: 2023-07-18 | End: 2023-08-16

## 2023-07-18 RX ORDER — METOPROLOL TARTRATE 50 MG
1 TABLET ORAL
Qty: 30 | Refills: 0
Start: 2023-07-18 | End: 2023-08-16

## 2023-07-18 RX ORDER — FUROSEMIDE 40 MG
1 TABLET ORAL
Qty: 30 | Refills: 0
Start: 2023-07-18 | End: 2023-08-16

## 2023-07-18 RX ORDER — SPIRONOLACTONE 25 MG/1
1 TABLET, FILM COATED ORAL
Qty: 30 | Refills: 0
Start: 2023-07-18 | End: 2023-08-16

## 2023-07-18 RX ORDER — SPIRONOLACTONE 25 MG/1
25 TABLET, FILM COATED ORAL DAILY
Refills: 0 | Status: DISCONTINUED | OUTPATIENT
Start: 2023-07-18 | End: 2023-07-18

## 2023-07-18 RX ADMIN — Medication 25 MILLIGRAM(S): at 06:00

## 2023-07-18 RX ADMIN — Medication 2: at 13:34

## 2023-07-18 RX ADMIN — Medication 20 MILLIGRAM(S): at 06:00

## 2023-07-18 RX ADMIN — CHLORHEXIDINE GLUCONATE 1 APPLICATION(S): 213 SOLUTION TOPICAL at 11:08

## 2023-07-18 RX ADMIN — SACUBITRIL AND VALSARTAN 1 TABLET(S): 24; 26 TABLET, FILM COATED ORAL at 06:00

## 2023-07-18 RX ADMIN — SACUBITRIL AND VALSARTAN 1 TABLET(S): 24; 26 TABLET, FILM COATED ORAL at 17:56

## 2023-07-18 RX ADMIN — Medication 1: at 09:32

## 2023-07-18 RX ADMIN — SPIRONOLACTONE 25 MILLIGRAM(S): 25 TABLET, FILM COATED ORAL at 17:57

## 2023-07-18 RX ADMIN — Medication 1: at 17:57

## 2023-07-18 NOTE — PROGRESS NOTE ADULT - PROBLEM SELECTOR PLAN 5
DVT ppx: SCDs  Diet: Carb consistent/DASH DVT ppx: ambulatory  Diet: Carb consistent/DASH  Dispo: home

## 2023-07-18 NOTE — PROGRESS NOTE ADULT - PROVIDER SPECIALTY LIST ADULT
Cardiology
Cardiology
Internal Medicine
Internal Medicine
Cardiology
Internal Medicine

## 2023-07-18 NOTE — PROGRESS NOTE ADULT - ATTENDING COMMENTS
56 y/o woman with HTN, DM, HLD presenting with LE edema with apparently newly diagnosed left ventricular systolic dysfunction.  --LE edema improved but still present--IV diureses to keep O>I, daily weights and strict Is and Os.    --Some LE tenderness to palpation bilaterally--can check LE venous duplex.  --Attempt to get records of prior cardiac work-up; patient states she has had echo/stress at OSH.  --Given markedly reduced LV systolic function, likely R&L heart catheterization next week.  --If no obstructive disease, would consider cMR.  --Optimize medical therapy for HF.  --Monitor on telemetry.   --Will follow.
58 y/o woman with HTN, DM, HLD presenting with LE edema found to have new HFrEF s/p C 7/17/23 with normal coronaries.    She feels well today without CP or SOB. She still has mild 1+ pitting edema up to mid-shins b/l. Her lungs are clear.    -There are reports of pt drinking lots of alcohol; possible etiology to her NICM?  -Agree with resident's A/P as above: continue GDMT optimization as above, on Toprol 25mg XL daily, entresto 24/26 bid, start aldactone 25mg daily. Can add SGLTi as outpatient  -Continue Lasix 20mg PO daily  -Patient needs close cardiology f/u 1-2 weeks post discharge. She will need re-evaluation of her LVEF once on optimal GDMT. She states she lives in [place] but can RTC in [place] at least 1 time. Can consider outpatient cMRI.
56 y/o woman with HTN, DM, HLD presenting with LE edema found to have new HFrEF s/p LHC 7/17/23 with normal coronaries.    Pt has clear lungs, 1+ pitting edema up to mid-shins b/l.    -There are reports of pt drinking lots of alcohol; possible etiology to her NICM?  -Agree with resident's A/P as above: continue GDMT optimization as above  -If stable post LHC, will add aldactone tomorrow  -will need close cardiology f/u for re-evaluation of LVEF once on GDMT. Can consider outpatient cMRI.
56 y/o F w PMHx of HTN, T2DM, HLD, noncompliant with home medications presenting with 3 weeks of worsening LE edema, admitted for newly diagnosed systolic heart failure.       Plan:   -ECHO TTE:  Moderately dilated left ventricular cavity size with EF of 30-35%, glocal L ventricular hypokinesis   -C/w Lasix 20mg IVP--> change to PO  -Start Entresto, Metoprolol, DC/ed Losartan   -Monitor lytes and replete as necessary  -Doppler of LE: negative   -Trop not impressive, cardiology following for ischemic workup. Possible cath Monday  -Maintain strict I/O, daily weight  -Repeat ECG, none in chart   -CXR: cardiomegaly noted, hazy b/l interstitial marking indicative for pulmonary edema   -Cardiology following     Code: full   Diet: CC/DASH   Activity: independent   VTE PPx: none needed.
56 y/o F w PMHx of HTN, T2DM, HLD, noncompliant with home medications presenting with 3 weeks of worsening LE edema, admitted for newly diagnosed systolic heart failure.     -ECHO TTE:  Moderately dilated left ventricular cavity size with EF of 30-35%, glocal L ventricular hypokinesis   -Lasix 20mg IVP--> changed to PO  - Entresto, Metoprolol, Aldactone added today. LH cath normal.   - D/c home with follow up. D/c time 35 mins.
56 y/o F w PMHx of HTN, T2DM, HLD, noncompliant with home medications presenting with 3 weeks of worsening LE edema, admitted for newly diagnosed systolic heart failure.     -ECHO TTE:  Moderately dilated left ventricular cavity size with EF of 30-35%, glocal L ventricular hypokinesis   -Lasix 20mg IVP--> change to PO  - Entresto, Metoprolol  - Plan for cath today
56 y/o F w PMHx of HTN, T2DM, HLD, noncompliant with home medications presenting with 3 weeks of worsening LE edema, admitted for newly diagnosed systolic heart failure.       Plan:   -ECHO TTE:  Moderately dilated left ventricular cavity size with EF of 30-35%, glocal L ventricular hypokinesis   -C/w Lasix 20mg IVP QD  -Monitor lytes and replete as necessary  -Doppler of LE: negative   -Trop not impressive, would recommend further evaluation and establish care with cardiology for ischemic workup  -Maintain strict I/O, daily weight  -Repeat ECG, none in chart   -CXR: cardiomegaly noted, hazy b/l interstitial marking indicative for pulmonary edema   -Cardiology consulted       Code: full   Diet: CC/DASH   Activity: independent   VTE PPx: none needed.

## 2023-07-18 NOTE — PROGRESS NOTE ADULT - ASSESSMENT
58 y/o F w PMHx of HTN, T2DM, HLD presenting with 3 weeks of worsening LE edema found to have new LV dysfunction and acute decompensated heart failure, left cardiac cath performed 7/17 with grossly normal results.     1. HFrEF/Decompensated HF - concern for dilated cardiomyopathy in the setting of chronic alcohol use however ischemic disease needs to be ruled out  2. Mod-Severe MR/TR  3. T2DM  4. HLD  5. HTN      RECOMMENDATIONS:  - Given normal LHC showing normal arterial perfusion no need for further cath procedures  - Medical management in the setting of HF  - C/w Entresto 24/26   - C/w Metop XL 25mg  - Start spirinolactone 25mg  - Can wait until outpt to add SGLT2 inhibitor   - Consideration for cardiac MRI outpatient  - Close f/u w/cardiology outpt.   - C/w 20mg PO lasix  - C/w Atorvastatin 80mg daily  - Monitor on telemetry  - Keep Mg> 2 and K > 4  - Please recontact if significant changes to condition, cardiology open to reconsult and signing off at this time.      Note not final until attending attestation     56 y/o F w PMHx of HTN, T2DM, HLD presenting with 3 weeks of worsening LE edema found to have new LV dysfunction and acute decompensated heart failure, left cardiac cath performed 7/17 with grossly normal results.     1. HFrEF/Decompensated HF - concern for dilated cardiomyopathy in the setting of chronic alcohol use however ischemic disease needs to be ruled out  2. Mod-Severe MR/TR  3. T2DM  4. HLD  5. HTN      RECOMMENDATIONS:  - Given normal LHC showing normal arterial perfusion no need for further cath procedures  - Medical management in the setting of HF  - C/w Entresto 24/26   - C/w Metop XL 25mg  - Start spirinolactone 25mg qdaily  - Can wait until outpt to add SGLT2 inhibitor   - Consideration for cardiac MRI outpatient  - Close f/u w/cardiology outpt.   - C/w 20mg PO lasix  - C/w Atorvastatin 80mg daily  - Monitor on telemetry  - Keep Mg> 2 and K > 4  - Please recontact if significant changes to condition, cardiology open to reconsult and signing off at this time.      Note not final until attending attestation     58 y/o F w PMHx of HTN, T2DM, HLD presenting with 3 weeks of worsening LE edema found to have new LV dysfunction and acute decompensated heart failure, left cardiac cath performed 7/17 with grossly normal results.     1. HFrEF/Decompensated HF - concern for dilated cardiomyopathy in the setting of chronic alcohol use however ischemic disease needs to be ruled out  2. Mod-Severe MR/TR  3. T2DM  4. HLD  5. HTN      RECOMMENDATIONS:  - Given normal LHC showing normal arterial perfusion no need for further cath procedures, potentially related to cardia changes 2/2 alcohol use   - Medical management in the setting of HF  - C/w Entresto 24/26   - C/w Metop XL 25mg  - Start spirinolactone 25mg qdaily  - Can wait until outpt to add SGLT2 inhibitor   - Consideration for cardiac MRI outpatient  - Close f/u w/cardiology outpt.   - C/w 20mg PO lasix  - C/w Atorvastatin 80mg daily  - Monitor on telemetry  - Keep Mg> 2 and K > 4  - Please recontact if significant changes to condition, cardiology open to reconsult and signing off at this time.      Note not final until attending attestation     56 y/o F w PMHx of HTN, T2DM, HLD presenting with 3 weeks of worsening LE edema found to have new LV dysfunction and acute decompensated heart failure, left cardiac cath performed 7/17 with grossly normal results.     1. HFrEF/Decompensated HF - concern for dilated cardiomyopathy in the setting of chronic alcohol use however ischemic disease needs to be ruled out  2. Mod-Severe MR/TR  3. T2DM  4. HLD  5. HTN      RECOMMENDATIONS:  - Given normal LHC showing normal arterial perfusion no need for further cath procedures, potentially related to cardia changes 2/2 alcohol use   - Medical management in the setting of HF  - C/w Entresto 24/26   - C/w Metop XL 25mg  - Start spironolactone 25mg qdaily  - Can wait until outpt to add SGLT2 inhibitor   - Consideration for cardiac MRI outpatient  - Close f/u w/cardiology outpt.   - C/w 20mg PO lasix  - C/w Atorvastatin 80mg daily  - Monitor on telemetry  - Keep Mg> 2 and K > 4  - Please recontact if significant changes to condition, cardiology open to reconsult and signing off at this time.      Note not final until attending attestation

## 2023-07-18 NOTE — PROGRESS NOTE ADULT - SUBJECTIVE AND OBJECTIVE BOX
Patient seen and examined at bedside.    Overnight Events: No overnight Events     Subjective:     REVIEW OF SYSTEMS:  CONSTITUTIONAL: No weakness, fevers or chills  EYES/ENT: No visual changes;  No dysphagia  NECK: No pain or stiffness  RESPIRATORY: No cough, wheezing, hemoptysis; No shortness of breath  CARDIOVASCULAR: No chest pain or palpitations; improving lower extremity edema  GASTROINTESTINAL: No abdominal or epigastric pain. No nausea, vomiting, or hematemesis; No diarrhea or constipation. No melena or hematochezia.  BACK: No back pain  NEUROLOGICAL: No numbness or weakness  SKIN: No itching, burning, rashes, or lesions   All other review of systems is negative unless indicated above.    MEDICATIONS  (STANDING):  atorvastatin 80 milliGRAM(s) Oral at bedtime  chlorhexidine 4% Liquid 1 Application(s) Topical daily  dextrose 5%. 1000 milliLiter(s) (50 mL/Hr) IV Continuous <Continuous>  dextrose 5%. 1000 milliLiter(s) (100 mL/Hr) IV Continuous <Continuous>  dextrose 50% Injectable 25 Gram(s) IV Push once  dextrose 50% Injectable 25 Gram(s) IV Push once  dextrose 50% Injectable 12.5 Gram(s) IV Push once  furosemide    Tablet 20 milliGRAM(s) Oral daily  glucagon  Injectable 1 milliGRAM(s) IntraMuscular once  insulin lispro (ADMELOG) corrective regimen sliding scale   SubCutaneous at bedtime  insulin lispro (ADMELOG) corrective regimen sliding scale   SubCutaneous three times a day before meals  metoprolol succinate ER 25 milliGRAM(s) Oral daily  sacubitril 24 mG/valsartan 26 mG 1 Tablet(s) Oral two times a day    MEDICATIONS  (PRN):  acetaminophen     Tablet .. 650 milliGRAM(s) Oral every 6 hours PRN Temp greater or equal to 38C (100.4F), Mild Pain (1 - 3)  dextrose Oral Gel 15 Gram(s) Oral once PRN Blood Glucose LESS THAN 70 milliGRAM(s)/deciliter  melatonin 3 milliGRAM(s) Oral at bedtime PRN Insomnia        PAST MEDICAL & SURGICAL HISTORY:  Hypertension      Hyperlipidemia    Vital Signs Last 24 Hrs  T(C): 36.6 (18 Jul 2023 06:00), Max: 36.7 (17 Jul 2023 20:22)  T(F): 97.8 (18 Jul 2023 06:00), Max: 98 (17 Jul 2023 20:22)  HR: 78 (18 Jul 2023 06:00) (67 - 84)  BP: 150/92 (18 Jul 2023 06:00) (144/96 - 182/97)  BP(mean): --  RR: 18 (18 Jul 2023 06:00) (14 - 19)  SpO2: 95% (18 Jul 2023 06:00) (92% - 98%)    Parameters below as of 18 Jul 2023 06:00  Patient On (Oxygen Delivery Method): room air    I&O's Summary    17 Jul 2023 07:01  -  18 Jul 2023 07:00  --------------------------------------------------------  IN: 1080 mL / OUT: 800 mL / NET: 280 mL          Diabetes      No significant past surgical history            Physical Exam:  Appearance: No acute distress; well appearing, sitting upright in chair   Eyes: PERRL, EOMI, pink conjunctiva  HEENT: Normal oral mucosa  Cardiovascular: RRR, S1, S2, no murmurs, rubs, or gallops; mild LE edema L>R  Respiratory: Clear to auscultation bilaterally  Gastrointestinal: soft, non-tender, non-distended  Musculoskeletal: No clubbing; no joint deformity   Neurologic: No focal deficits   Psychiatry: AAOx3, mood is nervous & affect is congruent  Skin: No rashes, ecchymoses, or cyanosis, skin is tight around R leg and blanching but not pitting edema                           14.4   2.72  )-----------( 171      ( 18 Jul 2023 07:13 )             44.1       07-18    140  |  104  |  12  ----------------------------<  160<H>  3.9   |  24  |  0.74    Ca    8.9      18 Jul 2023 07:13  Phos  3.8     07-18  Mg     1.7     07-18                   Pt. Name:       BHUPENDRA ROE Study Date:    7/14/2023  MRN:            [id number]      YOB: 1965  Accession #:    [id number]       Age:           57 years  Account#:       [id number]    Gender:        F  Heart Rate:     83 bpm          Height:        67.00 in (170.18 cm)  Rhythm:                         Weight:        184.00 lb (83.46 kg)  Blood Pressure: 163/105 mmHg    BSA/BMI:       1.95 m² / 28.82 kg/m²  ________________________________________________________________________________________  Referring Physician:    [id number] Michael Reyez  Interpreting Physician: Charles Walker M.D.  Primary Sonographer:    Josi [facility]    CPT:               ECHO TTE WO CON COMP W DOPP - 43813.m; MYOCARDIAL STRAIN                     IMAGING - 11756.m  Indication(s):     Dyspnea, unspecified - R06.00  Procedure:         Transthoracic echocardiogram with 2-D, M-mode and complete                     spectral and color flow Doppler.  Ordering Location: [facility]  Study Information: Image quality for this study is fair.    _______________________________________________________________________________________  CONCLUSIONS:      1. Moderately dilated left ventricular cavity size. The left ventricular wall thickness is normal. The left ventricular systolic function is severely decreased with an ejection fraction visually estimated at 30 to 35 %. There is global left ventricular hypokinesis.   2. Left ventricular global longitudinal strain is -5.3 % is abnormal (> -16%). Images were acquired on a [employer] ultrasound system and processed using [employer] strain analysis software with a heart rate of 83 bpm and a blood pressure of 163/105 mmHg.   3. There is moderate (grade 2) left ventricular diastolic dysfunction.   4. Normal right ventricular cavity size, normal right ventricular wall thickness and reducedsystolic right ventricular function. The tricuspid annular plane systolic excursion (TAPSE) is 2.1 cm (normal >=1.7 cm).   5. Symmetric mitral valve leaflet tethering.   6. Moderate to severe mitral regurgitation. The mitral regurgitant jet is anteriorly directed.   7. Incomplete leaflet closure of the tricuspid valve due to tethering. Moderate-severe tricuspid regurgitation.   8. Estimated pulmonary artery systolic pressure is 73 mmHg.   9. The inferior vena cava is dilated measuring 2.60 cm in diameter, (dilated >2.1cm) with normal inspiratory collapse (normal >50%) consistent with mildly elevated right atrial pressure (~8, range 5-10mmHg).  10. Trace pericardial effusion with no evidence of hemodynamic compromise.  11. No prior echocardiogram is available for comparison.    Catheterization - Official Read Pending   Patient seen and examined at bedside.    Overnight Events: No overnight Events     Subjective: Feeling much better and less concerned about not having found anything. Walked around today     REVIEW OF SYSTEMS:  CONSTITUTIONAL: No weakness, fevers or chills  EYES/ENT: No visual changes;  No dysphagia  NECK: No pain or stiffness  RESPIRATORY: No cough, wheezing, hemoptysis; No shortness of breath  CARDIOVASCULAR: No chest pain or palpitations; improving lower extremity edema  GASTROINTESTINAL: No abdominal or epigastric pain. No nausea, vomiting, or hematemesis; No diarrhea or constipation. No melena or hematochezia.  BACK: No back pain  NEUROLOGICAL: No numbness or weakness  SKIN: No itching, burning, rashes, or lesions   All other review of systems is negative unless indicated above.    MEDICATIONS  (STANDING):  atorvastatin 80 milliGRAM(s) Oral at bedtime  chlorhexidine 4% Liquid 1 Application(s) Topical daily  dextrose 5%. 1000 milliLiter(s) (50 mL/Hr) IV Continuous <Continuous>  dextrose 5%. 1000 milliLiter(s) (100 mL/Hr) IV Continuous <Continuous>  dextrose 50% Injectable 25 Gram(s) IV Push once  dextrose 50% Injectable 25 Gram(s) IV Push once  dextrose 50% Injectable 12.5 Gram(s) IV Push once  furosemide    Tablet 20 milliGRAM(s) Oral daily  glucagon  Injectable 1 milliGRAM(s) IntraMuscular once  insulin lispro (ADMELOG) corrective regimen sliding scale   SubCutaneous at bedtime  insulin lispro (ADMELOG) corrective regimen sliding scale   SubCutaneous three times a day before meals  metoprolol succinate ER 25 milliGRAM(s) Oral daily  sacubitril 24 mG/valsartan 26 mG 1 Tablet(s) Oral two times a day    MEDICATIONS  (PRN):  acetaminophen     Tablet .. 650 milliGRAM(s) Oral every 6 hours PRN Temp greater or equal to 38C (100.4F), Mild Pain (1 - 3)  dextrose Oral Gel 15 Gram(s) Oral once PRN Blood Glucose LESS THAN 70 milliGRAM(s)/deciliter  melatonin 3 milliGRAM(s) Oral at bedtime PRN Insomnia        PAST MEDICAL & SURGICAL HISTORY:  Hypertension      Hyperlipidemia    Vital Signs Last 24 Hrs  T(C): 36.6 (18 Jul 2023 06:00), Max: 36.7 (17 Jul 2023 20:22)  T(F): 97.8 (18 Jul 2023 06:00), Max: 98 (17 Jul 2023 20:22)  HR: 78 (18 Jul 2023 06:00) (67 - 84)  BP: 150/92 (18 Jul 2023 06:00) (144/96 - 182/97)  BP(mean): --  RR: 18 (18 Jul 2023 06:00) (14 - 19)  SpO2: 95% (18 Jul 2023 06:00) (92% - 98%)    Parameters below as of 18 Jul 2023 06:00  Patient On (Oxygen Delivery Method): room air    I&O's Summary    17 Jul 2023 07:01  -  18 Jul 2023 07:00  --------------------------------------------------------  IN: 1080 mL / OUT: 800 mL / NET: 280 mL          Diabetes      No significant past surgical history            Physical Exam:  Appearance: No acute distress; well appearing, sitting upright in chair   Eyes: PERRL, EOMI, pink conjunctiva  HEENT: Normal oral mucosa  Cardiovascular: RRR, S1, S2, no murmurs, rubs, or gallops; mild LE edema L>R  Respiratory: Clear to auscultation bilaterally  Gastrointestinal: soft, non-tender, non-distended  Musculoskeletal: No clubbing; no joint deformity   Neurologic: No focal deficits   Psychiatry: AAOx3, mood is "great" affect is congruent  Skin: No rashes, ecchymoses, or cyanosis, skin is tight around R leg and blanching but not pitting edema                           14.4   2.72  )-----------( 171      ( 18 Jul 2023 07:13 )             44.1       07-18    140  |  104  |  12  ----------------------------<  160<H>  3.9   |  24  |  0.74    Ca    8.9      18 Jul 2023 07:13  Phos  3.8     07-18  Mg     1.7     07-18                   Pt. Name:       BHUPENDRA ROE Study Date:    7/14/2023  MRN:            [id number]      YOB: 1965  Accession #:    [id number]       Age:           57 years  Account#:       [id number]    Gender:        F  Heart Rate:     83 bpm          Height:        67.00 in (170.18 cm)  Rhythm:                         Weight:        184.00 lb (83.46 kg)  Blood Pressure: 163/105 mmHg    BSA/BMI:       1.95 m² / 28.82 kg/m²  ________________________________________________________________________________________  Referring Physician:    [id number] Michael Reyez  Interpreting Physician: Charles Walker M.D.  Primary Sonographer:    Josi Valdez [facility]    CPT:               ECHO TTE WO CON COMP W DOPP - 19892.m; MYOCARDIAL STRAIN                     IMAGING - 82930.m  Indication(s):     Dyspnea, unspecified - R06.00  Procedure:         Transthoracic echocardiogram with 2-D, M-mode and complete                     spectral and color flow Doppler.  Ordering Location: [facility]  Study Information: Image quality for this study is fair.    _______________________________________________________________________________________  CONCLUSIONS:      1. Moderately dilated left ventricular cavity size. The left ventricular wall thickness is normal. The left ventricular systolic function is severely decreased with an ejection fraction visually estimated at 30 to 35 %. There is global left ventricular hypokinesis.   2. Left ventricular global longitudinal strain is -5.3 % is abnormal (> -16%). Images were acquired on a [employer] ultrasound system and processed using [employer] strain analysis software with a heart rate of 83 bpm and a blood pressure of 163/105 mmHg.   3. There is moderate (grade 2) left ventricular diastolic dysfunction.   4. Normal right ventricular cavity size, normal right ventricular wall thickness and reducedsystolic right ventricular function. The tricuspid annular plane systolic excursion (TAPSE) is 2.1 cm (normal >=1.7 cm).   5. Symmetric mitral valve leaflet tethering.   6. Moderate to severe mitral regurgitation. The mitral regurgitant jet is anteriorly directed.   7. Incomplete leaflet closure of the tricuspid valve due to tethering. Moderate-severe tricuspid regurgitation.   8. Estimated pulmonary artery systolic pressure is 73 mmHg.   9. The inferior vena cava is dilated measuring 2.60 cm in diameter, (dilated >2.1cm) with normal inspiratory collapse (normal >50%) consistent with mildly elevated right atrial pressure (~8, range 5-10mmHg).  10. Trace pericardial effusion with no evidence of hemodynamic compromise.  11. No prior echocardiogram is available for comparison.    Catheterization - Official Read Pending

## 2023-07-18 NOTE — PROGRESS NOTE ADULT - REASON FOR ADMISSION
Lower Extremity swelling
Left extremity swelling
CC: LE swelling

## 2023-07-18 NOTE — PROGRESS NOTE ADULT - SUBJECTIVE AND OBJECTIVE BOX
PATIENT: BHUPENDRA ROE, MRN: [id number]    CHIEF COMPLAINT: Patient is a 57y old  Female who presents with a chief complaint of Left extremity swelling (17 Jul 2023 14:03)      INTERVAL HISTORY/OVERNIGHT EVENTS: No overnight events.       MEDICATIONS:  MEDICATIONS  (STANDING):  atorvastatin 80 milliGRAM(s) Oral at bedtime  chlorhexidine 4% Liquid 1 Application(s) Topical daily  dextrose 5%. 1000 milliLiter(s) (100 mL/Hr) IV Continuous <Continuous>  dextrose 5%. 1000 milliLiter(s) (50 mL/Hr) IV Continuous <Continuous>  dextrose 50% Injectable 25 Gram(s) IV Push once  dextrose 50% Injectable 25 Gram(s) IV Push once  dextrose 50% Injectable 12.5 Gram(s) IV Push once  furosemide    Tablet 20 milliGRAM(s) Oral daily  glucagon  Injectable 1 milliGRAM(s) IntraMuscular once  insulin lispro (ADMELOG) corrective regimen sliding scale   SubCutaneous at bedtime  insulin lispro (ADMELOG) corrective regimen sliding scale   SubCutaneous three times a day before meals  metoprolol succinate ER 25 milliGRAM(s) Oral daily  sacubitril 24 mG/valsartan 26 mG 1 Tablet(s) Oral two times a day    MEDICATIONS  (PRN):  acetaminophen     Tablet .. 650 milliGRAM(s) Oral every 6 hours PRN Temp greater or equal to 38C (100.4F), Mild Pain (1 - 3)  dextrose Oral Gel 15 Gram(s) Oral once PRN Blood Glucose LESS THAN 70 milliGRAM(s)/deciliter  melatonin 3 milliGRAM(s) Oral at bedtime PRN Insomnia      ALLERGIES: Allergies    No Known Allergies    Intolerances    Tea (Urticaria (Mild to Mod))      OBJECTIVE:  ICU Vital Signs Last 24 Hrs  T(C): 36.6 (18 Jul 2023 06:00), Max: 36.7 (17 Jul 2023 11:19)  T(F): 97.8 (18 Jul 2023 06:00), Max: 98 (17 Jul 2023 11:19)  HR: 78 (18 Jul 2023 06:00) (67 - 84)  BP: 150/92 (18 Jul 2023 06:00) (141/89 - 182/97)  BP(mean): --  ABP: --  ABP(mean): --  RR: 18 (18 Jul 2023 06:00) (14 - 19)  SpO2: 95% (18 Jul 2023 06:00) (92% - 99%)    O2 Parameters below as of 18 Jul 2023 06:00  Patient On (Oxygen Delivery Method): room air            CAPILLARY BLOOD GLUCOSE      POCT Blood Glucose.: 193 mg/dL (17 Jul 2023 21:07)  POCT Blood Glucose.: 148 mg/dL (17 Jul 2023 17:41)  POCT Blood Glucose.: 176 mg/dL (17 Jul 2023 12:49)  POCT Blood Glucose.: 136 mg/dL (17 Jul 2023 08:45)    CAPILLARY BLOOD GLUCOSE      POCT Blood Glucose.: 193 mg/dL (17 Jul 2023 21:07)    I&O's Summary    16 Jul 2023 07:01  -  17 Jul 2023 07:00  --------------------------------------------------------  IN: 600 mL / OUT: 2460 mL / NET: -1860 mL    17 Jul 2023 07:01  -  18 Jul 2023 06:33  --------------------------------------------------------  IN: 960 mL / OUT: 800 mL / NET: 160 mL      Daily     Daily     PHYSICAL EXAMINATION:  General: Comfortable, no acute distress, cooperative with exam.  HEENT: PERRLA  Respiratory: CTAB, normal respiratory effort, no coughing, wheezes, crackles, or rales.  CV: RRR, S1S2, no murmurs, rubs or gallops. No JVD. Distal pulses intact.  Abdominal: Soft, nontender, nondistended, no rebound or guarding, normal bowel sounds.  Neurology: AOx3, no focal neuro defects, WATTS x 4.  Extremities: No pitting edema, + Peripheral pulses.      LABS:                          13.5   2.48  )-----------( 162      ( 17 Jul 2023 07:15 )             40.3     07-17    140  |  102  |  18  ----------------------------<  138<H>  4.1   |  25  |  0.95    Ca    9.5      17 Jul 2023 07:13  Phos  4.2     07-17  Mg     2.0     07-17                Urinalysis Basic - ( 17 Jul 2023 07:13 )    Color: x / Appearance: x / SG: x / pH: x  Gluc: 138 mg/dL / Ketone: x  / Bili: x / Urobili: x   Blood: x / Protein: x / Nitrite: x   Leuk Esterase: x / RBC: x / WBC x   Sq Epi: x / Non Sq Epi: x / Bacteria: x       PATIENT: BHUPENDRA ROE, MRN: [id number]    CHIEF COMPLAINT: Patient is a 57y old  Female who presents with a chief complaint of Left extremity swelling (17 Jul 2023 14:03)      INTERVAL HISTORY/OVERNIGHT EVENTS: No overnight events. s/p LHC no complications      MEDICATIONS:  MEDICATIONS  (STANDING):  atorvastatin 80 milliGRAM(s) Oral at bedtime  chlorhexidine 4% Liquid 1 Application(s) Topical daily  dextrose 5%. 1000 milliLiter(s) (100 mL/Hr) IV Continuous <Continuous>  dextrose 5%. 1000 milliLiter(s) (50 mL/Hr) IV Continuous <Continuous>  dextrose 50% Injectable 25 Gram(s) IV Push once  dextrose 50% Injectable 25 Gram(s) IV Push once  dextrose 50% Injectable 12.5 Gram(s) IV Push once  furosemide    Tablet 20 milliGRAM(s) Oral daily  glucagon  Injectable 1 milliGRAM(s) IntraMuscular once  insulin lispro (ADMELOG) corrective regimen sliding scale   SubCutaneous at bedtime  insulin lispro (ADMELOG) corrective regimen sliding scale   SubCutaneous three times a day before meals  metoprolol succinate ER 25 milliGRAM(s) Oral daily  sacubitril 24 mG/valsartan 26 mG 1 Tablet(s) Oral two times a day    MEDICATIONS  (PRN):  acetaminophen     Tablet .. 650 milliGRAM(s) Oral every 6 hours PRN Temp greater or equal to 38C (100.4F), Mild Pain (1 - 3)  dextrose Oral Gel 15 Gram(s) Oral once PRN Blood Glucose LESS THAN 70 milliGRAM(s)/deciliter  melatonin 3 milliGRAM(s) Oral at bedtime PRN Insomnia      ALLERGIES: Allergies    No Known Allergies    Intolerances    Tea (Urticaria (Mild to Mod))      OBJECTIVE:  ICU Vital Signs Last 24 Hrs  T(C): 36.6 (18 Jul 2023 06:00), Max: 36.7 (17 Jul 2023 11:19)  T(F): 97.8 (18 Jul 2023 06:00), Max: 98 (17 Jul 2023 11:19)  HR: 78 (18 Jul 2023 06:00) (67 - 84)  BP: 150/92 (18 Jul 2023 06:00) (141/89 - 182/97)  BP(mean): --  ABP: --  ABP(mean): --  RR: 18 (18 Jul 2023 06:00) (14 - 19)  SpO2: 95% (18 Jul 2023 06:00) (92% - 99%)    O2 Parameters below as of 18 Jul 2023 06:00  Patient On (Oxygen Delivery Method): room air            CAPILLARY BLOOD GLUCOSE      POCT Blood Glucose.: 193 mg/dL (17 Jul 2023 21:07)  POCT Blood Glucose.: 148 mg/dL (17 Jul 2023 17:41)  POCT Blood Glucose.: 176 mg/dL (17 Jul 2023 12:49)  POCT Blood Glucose.: 136 mg/dL (17 Jul 2023 08:45)    CAPILLARY BLOOD GLUCOSE      POCT Blood Glucose.: 193 mg/dL (17 Jul 2023 21:07)    I&O's Summary    16 Jul 2023 07:01  -  17 Jul 2023 07:00  --------------------------------------------------------  IN: 600 mL / OUT: 2460 mL / NET: -1860 mL    17 Jul 2023 07:01  -  18 Jul 2023 06:33  --------------------------------------------------------  IN: 960 mL / OUT: 800 mL / NET: 160 mL      Daily     Daily     PHYSICAL EXAMINATION:  General: Comfortable, no acute distress, cooperative with exam.  HEENT: PERRLA  Respiratory: CTAB, normal respiratory effort, no coughing, wheezes, crackles, or rales.  CV: RRR, S1S2, no murmurs, rubs or gallops. No JVD. Distal pulses intact.  Abdominal: Soft, nontender, nondistended, no rebound or guarding, normal bowel sounds.  Neurology: AOx3, no focal neuro defects, WATTS x 4.  Extremities: improving b/l LE edema.      LABS:                          13.5   2.48  )-----------( 162      ( 17 Jul 2023 07:15 )             40.3     07-17    140  |  102  |  18  ----------------------------<  138<H>  4.1   |  25  |  0.95    Ca    9.5      17 Jul 2023 07:13  Phos  4.2     07-17  Mg     2.0     07-17                Urinalysis Basic - ( 17 Jul 2023 07:13 )    Color: x / Appearance: x / SG: x / pH: x  Gluc: 138 mg/dL / Ketone: x  / Bili: x / Urobili: x   Blood: x / Protein: x / Nitrite: x   Leuk Esterase: x / RBC: x / WBC x   Sq Epi: x / Non Sq Epi: x / Bacteria: x

## 2023-07-18 NOTE — PROGRESS NOTE ADULT - PROBLEM SELECTOR PROBLEM 1
Acute HFrEF (heart failure with reduced ejection fraction)

## 2023-07-18 NOTE — DISCHARGE NOTE NURSING/CASE MANAGEMENT/SOCIAL WORK - PATIENT PORTAL LINK FT
You can access the FollowMyHealth Patient Portal offered by [facility] by registering at the following website: [email or web address]. By joining [employer]’s FollowMyHealth portal, you will also be able to view your health information using other applications (apps) compatible with our system.

## 2023-07-18 NOTE — PROGRESS NOTE ADULT - PROBLEM SELECTOR PLAN 1
3 weeks of worsening B/L LE edema. No CP, SOB. CXR with mild pulm edema.  pBNP 3000s. Trop neg.   -LE duplex neg for DVT   -S/p Lasix 20 IV x1 in ED  -Monitor I/O; daily weight - net neg 1.6L   -TTE: EF 30-35%, global akinesis   -continue lasix 20 PO daily  - [facility] 7/17    GDMT  -Entresto 24/26  -Toprol 25 3 weeks of worsening B/L LE edema. No CP, SOB. CXR with mild pulm edema.  pBNP 3000s. Trop neg.   -LE duplex neg for DVT   -S/p Lasix 20 IV x1 in ED  -Monitor I/O; daily weight - net neg 1.6L   -TTE: EF 30-35%, global akinesis   -continue lasix 20 PO daily  - [facility] 7/17    GDMT  -Entresto 24/26  -Toprol 25  -Aldactone 25

## 2024-04-14 RX ORDER — METFORMIN HYDROCHLORIDE 850 MG/1
1 TABLET ORAL
Refills: 0 | DISCHARGE

## 2024-04-14 RX ORDER — AMLODIPINE BESYLATE 2.5 MG/1
1 TABLET ORAL
Refills: 0 | DISCHARGE

## 2024-04-14 RX ORDER — ROSUVASTATIN CALCIUM 5 MG/1
1 TABLET ORAL
Refills: 0 | DISCHARGE